# Patient Record
Sex: MALE | Race: WHITE | Employment: FULL TIME | URBAN - METROPOLITAN AREA
[De-identification: names, ages, dates, MRNs, and addresses within clinical notes are randomized per-mention and may not be internally consistent; named-entity substitution may affect disease eponyms.]

---

## 2017-04-27 RX ORDER — SIMVASTATIN 20 MG
TABLET ORAL
Qty: 90 TABLET | Refills: 0 | Status: SHIPPED | OUTPATIENT
Start: 2017-04-27 | End: 2017-08-18 | Stop reason: SDUPTHER

## 2017-08-18 ENCOUNTER — OFFICE VISIT (OUTPATIENT)
Dept: INTERNAL MEDICINE CLINIC | Age: 67
End: 2017-08-18

## 2017-08-18 ENCOUNTER — HOSPITAL ENCOUNTER (OUTPATIENT)
Dept: OTHER | Age: 67
Discharge: HOME | End: 2017-08-18
Attending: INTERNAL MEDICINE | Admitting: INTERNAL MEDICINE

## 2017-08-18 VITALS
WEIGHT: 258 LBS | DIASTOLIC BLOOD PRESSURE: 65 MMHG | HEIGHT: 73 IN | RESPIRATION RATE: 16 BRPM | BODY MASS INDEX: 34.19 KG/M2 | SYSTOLIC BLOOD PRESSURE: 133 MMHG | HEART RATE: 86 BPM

## 2017-08-18 DIAGNOSIS — E66.9 OBESITY (BMI 30-39.9): ICD-10-CM

## 2017-08-18 DIAGNOSIS — M54.41 ACUTE RIGHT-SIDED LOW BACK PAIN WITH RIGHT-SIDED SCIATICA: ICD-10-CM

## 2017-08-18 DIAGNOSIS — I10 BENIGN ESSENTIAL HTN: ICD-10-CM

## 2017-08-18 DIAGNOSIS — E78.5 HYPERLIPIDEMIA, UNSPECIFIED HYPERLIPIDEMIA TYPE: ICD-10-CM

## 2017-08-18 DIAGNOSIS — L82.1 SEBORRHEIC KERATOSES: ICD-10-CM

## 2017-08-18 DIAGNOSIS — Z12.11 SCREEN FOR COLON CANCER: ICD-10-CM

## 2017-08-18 DIAGNOSIS — E11.9 TYPE 2 DIABETES MELLITUS WITHOUT COMPLICATION, WITHOUT LONG-TERM CURRENT USE OF INSULIN (HCC): Primary | ICD-10-CM

## 2017-08-18 LAB
CREATININE URINE: 77.8 MG/DL (ref 39–259)
MICROALBUMIN UR-MCNC: <1.2 MG/DL
MICROALBUMIN/CREAT UR-RTO: NORMAL MG/G (ref 0–30)

## 2017-08-18 PROCEDURE — 99214 OFFICE O/P EST MOD 30 MIN: CPT | Performed by: INTERNAL MEDICINE

## 2017-08-18 RX ORDER — SIMVASTATIN 20 MG
TABLET ORAL
Qty: 90 TABLET | Refills: 3 | Status: SHIPPED | OUTPATIENT
Start: 2017-08-18 | End: 2018-11-17 | Stop reason: SDUPTHER

## 2017-08-25 ENCOUNTER — TELEPHONE (OUTPATIENT)
Dept: INTERNAL MEDICINE CLINIC | Age: 67
End: 2017-08-25

## 2017-08-25 DIAGNOSIS — R10.9 STOMACH PAIN: Primary | ICD-10-CM

## 2017-09-15 ENCOUNTER — HOSPITAL ENCOUNTER (OUTPATIENT)
Dept: ULTRASOUND IMAGING | Age: 67
Discharge: HOME | End: 2017-09-15
Attending: INTERNAL MEDICINE | Admitting: INTERNAL MEDICINE

## 2017-09-15 DIAGNOSIS — R10.9 STOMACH PAIN: ICD-10-CM

## 2017-09-15 DIAGNOSIS — R10.9 ABDOMINAL PAIN: ICD-10-CM

## 2017-09-19 DIAGNOSIS — K80.20 CALCULUS OF GALLBLADDER WITHOUT CHOLECYSTITIS WITHOUT OBSTRUCTION: Primary | ICD-10-CM

## 2017-10-02 ENCOUNTER — OFFICE VISIT (OUTPATIENT)
Dept: SURGERY | Age: 67
End: 2017-10-02

## 2017-10-02 VITALS
DIASTOLIC BLOOD PRESSURE: 78 MMHG | WEIGHT: 259 LBS | BODY MASS INDEX: 34.33 KG/M2 | TEMPERATURE: 97.9 F | SYSTOLIC BLOOD PRESSURE: 130 MMHG | HEIGHT: 73 IN

## 2017-10-02 DIAGNOSIS — K80.20 CALCULUS OF GALLBLADDER WITHOUT CHOLECYSTITIS WITHOUT OBSTRUCTION: Primary | ICD-10-CM

## 2017-10-02 PROCEDURE — 99214 OFFICE O/P EST MOD 30 MIN: CPT | Performed by: SURGERY

## 2017-10-16 ENCOUNTER — HOSPITAL ENCOUNTER (OUTPATIENT)
Dept: ENDOSCOPY | Age: 67
Discharge: HOME | End: 2017-10-16
Attending: INTERNAL MEDICINE | Admitting: INTERNAL MEDICINE

## 2017-10-16 VITALS
SYSTOLIC BLOOD PRESSURE: 145 MMHG | HEIGHT: 72 IN | DIASTOLIC BLOOD PRESSURE: 77 MMHG | HEART RATE: 76 BPM | BODY MASS INDEX: 35.21 KG/M2 | WEIGHT: 260 LBS | TEMPERATURE: 97.8 F | RESPIRATION RATE: 20 BRPM | OXYGEN SATURATION: 100 %

## 2017-10-16 LAB
GLUCOSE BLD-MCNC: 150 MG/DL (ref 70–99)
PERFORMED ON: ABNORMAL

## 2017-10-16 NOTE — PLAN OF CARE
ADMISSION PRE-PROCEDURE INTRA-PROCEDURE POST-PROCEDURE: RECOVERY/ DISCHARGE   ASSESSMENT &  EVALUATION CONSULTS [x] Verify patient identification, allergies, vital signs, NPO, IV, & SPO2  [x] Complete the ALMA SCORE  [x] Consent form to treat signed  [x] History and Physical [x] Reassess patient after pre- procedure medication given  [x] GI physician evaluates pt  [x] Verify patient's name, allergies [x] Continuous monitoring of vital  signs, SPO2, LOC  [x] Emotional status  [x] Patient comfort level [x] Total system admission assessment with appropriate intervention  [x] Pain evaluation and management  [x] Discharge criteria met  [x] Discharge assessment with appropriate intervention  [x] Compare with pre-procedure status  [x] Discharge by appropriate physician   DIAGNOSTIC / TESTS [x]  Lab work ordered  [x]  Obtain and attach lab work to patient's chart  [x]  Report abnormals and F/U with physician [x] Assure needed test results are present [x] Diagnostic testing as indicated  [x] Obtained specimens sent to lab [x] Diagnostic testing as indicated    [x] Assess patient's gag reflex    MEDICATIONS [x]  Conscious sedation medications  explained to patient  [x]  Start IV per physician's order  and/or protocol  [x]  Verify compliance of the colon prep  []  Pre-procedure med. as ordered  [x] Verify compliance of colon prep. [x] Re-check IV access [x] Assist with administration of IV conscious sedation medication  [x] Start O2  per  nasal cannula, if needed   [x] IV fluids as indicated/ordered  [x] Administration of medications as ordered  [x] Medications as prescribed  [x] D/C O2 therapy as ordered   PROCEDURE/TREATMENT [x] Specific order by GI physician  [x] Specific procedure as scheduled  [x]  Verify procedure as ordered [x] Time out/procedure verification checklist complete.  [x] EGD/Colonoscopy and related procedures  [x] Assist physician with the procedure [x] Treatment as indicated   NUTRITION / DIET [x] NPO

## 2017-10-24 ENCOUNTER — TELEPHONE (OUTPATIENT)
Dept: INTERNAL MEDICINE CLINIC | Age: 67
End: 2017-10-24

## 2018-03-02 ENCOUNTER — TELEPHONE (OUTPATIENT)
Dept: INTERNAL MEDICINE CLINIC | Age: 68
End: 2018-03-02

## 2018-03-02 DIAGNOSIS — E78.5 HYPERLIPIDEMIA, UNSPECIFIED HYPERLIPIDEMIA TYPE: ICD-10-CM

## 2018-03-02 DIAGNOSIS — E55.9 VITAMIN D DEFICIENCY: ICD-10-CM

## 2018-03-02 DIAGNOSIS — E11.9 TYPE 2 DIABETES MELLITUS WITHOUT COMPLICATION, WITHOUT LONG-TERM CURRENT USE OF INSULIN (HCC): Primary | ICD-10-CM

## 2018-03-02 DIAGNOSIS — I10 BENIGN ESSENTIAL HTN: ICD-10-CM

## 2018-03-27 ENCOUNTER — TELEPHONE (OUTPATIENT)
Dept: ORTHOPEDIC SURGERY | Age: 68
End: 2018-03-27

## 2018-03-27 LAB
ALBUMIN SERPL-MCNC: 4.4 G/DL
ALP BLD-CCNC: 64 U/L
ALT SERPL-CCNC: 29 U/L
ANION GAP SERPL CALCULATED.3IONS-SCNC: NORMAL MMOL/L
AST SERPL-CCNC: 20 U/L
AVERAGE GLUCOSE: NORMAL
BASOPHILS ABSOLUTE: NORMAL /ΜL
BASOPHILS ABSOLUTE: NORMAL /ΜL
BASOPHILS RELATIVE PERCENT: NORMAL %
BASOPHILS RELATIVE PERCENT: NORMAL %
BILIRUB SERPL-MCNC: 0.4 MG/DL (ref 0.1–1.4)
BUN BLDV-MCNC: 16 MG/DL
CALCIUM SERPL-MCNC: 9.4 MG/DL
CHLORIDE BLD-SCNC: 103 MMOL/L
CHOLESTEROL, TOTAL: 153 MG/DL
CHOLESTEROL/HDL RATIO: 2.7
CO2: 26 MMOL/L
CREAT SERPL-MCNC: 0.79 MG/DL
EOSINOPHILS ABSOLUTE: NORMAL /ΜL
EOSINOPHILS ABSOLUTE: NORMAL /ΜL
EOSINOPHILS RELATIVE PERCENT: NORMAL %
EOSINOPHILS RELATIVE PERCENT: NORMAL %
GFR CALCULATED: NORMAL
GLUCOSE BLD-MCNC: 139 MG/DL
HBA1C MFR BLD: 6.5 %
HCT VFR BLD CALC: 42.9 % (ref 41–53)
HCT VFR BLD CALC: NORMAL % (ref 41–53)
HDLC SERPL-MCNC: 56 MG/DL (ref 35–70)
HEMOGLOBIN: 14.9 G/DL (ref 13.5–17.5)
HEMOGLOBIN: NORMAL G/DL (ref 13.5–17.5)
LDL CHOLESTEROL CALCULATED: 78 MG/DL (ref 0–160)
LYMPHOCYTES ABSOLUTE: NORMAL /ΜL
LYMPHOCYTES ABSOLUTE: NORMAL /ΜL
LYMPHOCYTES RELATIVE PERCENT: NORMAL %
LYMPHOCYTES RELATIVE PERCENT: NORMAL %
MCH RBC QN AUTO: NORMAL PG
MCH RBC QN AUTO: NORMAL PG
MCHC RBC AUTO-ENTMCNC: NORMAL G/DL
MCHC RBC AUTO-ENTMCNC: NORMAL G/DL
MCV RBC AUTO: NORMAL FL
MCV RBC AUTO: NORMAL FL
MONOCYTES ABSOLUTE: NORMAL /ΜL
MONOCYTES ABSOLUTE: NORMAL /ΜL
MONOCYTES RELATIVE PERCENT: NORMAL %
MONOCYTES RELATIVE PERCENT: NORMAL %
NEUTROPHILS ABSOLUTE: NORMAL /ΜL
NEUTROPHILS ABSOLUTE: NORMAL /ΜL
NEUTROPHILS RELATIVE PERCENT: NORMAL %
NEUTROPHILS RELATIVE PERCENT: NORMAL %
PDW BLD-RTO: NORMAL %
PLATELET # BLD: NORMAL K/ΜL
PLATELET # BLD: NORMAL K/ΜL
PMV BLD AUTO: NORMAL FL
PMV BLD AUTO: NORMAL FL
POTASSIUM SERPL-SCNC: 4.7 MMOL/L
RBC # BLD: 4.8 10^6/ΜL
RBC # BLD: NORMAL 10^6/ΜL
SODIUM BLD-SCNC: 139 MMOL/L
TOTAL PROTEIN: 7.3
TRIGL SERPL-MCNC: 104 MG/DL
TSH SERPL DL<=0.05 MIU/L-ACNC: 1.81 UIU/ML
VITAMIN D 25-HYDROXY: 43
VITAMIN D2, 25 HYDROXY: NORMAL
VITAMIN D3,25 HYDROXY: NORMAL
VLDLC SERPL CALC-MCNC: NORMAL MG/DL
WBC # BLD: 7 10^3/ML
WBC # BLD: NORMAL 10^3/ML

## 2018-03-29 ENCOUNTER — OFFICE VISIT (OUTPATIENT)
Dept: INTERNAL MEDICINE CLINIC | Age: 68
End: 2018-03-29

## 2018-03-29 VITALS
SYSTOLIC BLOOD PRESSURE: 134 MMHG | HEIGHT: 73 IN | RESPIRATION RATE: 16 BRPM | BODY MASS INDEX: 34.72 KG/M2 | WEIGHT: 262 LBS | HEART RATE: 78 BPM | DIASTOLIC BLOOD PRESSURE: 74 MMHG

## 2018-03-29 DIAGNOSIS — E55.9 VITAMIN D DEFICIENCY: ICD-10-CM

## 2018-03-29 DIAGNOSIS — E11.9 TYPE 2 DIABETES MELLITUS WITHOUT COMPLICATION, WITHOUT LONG-TERM CURRENT USE OF INSULIN (HCC): Primary | ICD-10-CM

## 2018-03-29 DIAGNOSIS — R39.12 BENIGN PROSTATIC HYPERPLASIA WITH WEAK URINARY STREAM: ICD-10-CM

## 2018-03-29 DIAGNOSIS — Z12.5 SCREENING FOR PROSTATE CANCER: ICD-10-CM

## 2018-03-29 DIAGNOSIS — I10 BENIGN ESSENTIAL HTN: ICD-10-CM

## 2018-03-29 DIAGNOSIS — N40.1 BENIGN PROSTATIC HYPERPLASIA WITH WEAK URINARY STREAM: ICD-10-CM

## 2018-03-29 DIAGNOSIS — E78.5 HYPERLIPIDEMIA, UNSPECIFIED HYPERLIPIDEMIA TYPE: ICD-10-CM

## 2018-03-29 DIAGNOSIS — K80.20 CALCULUS OF GALLBLADDER WITHOUT CHOLECYSTITIS WITHOUT OBSTRUCTION: ICD-10-CM

## 2018-03-29 PROCEDURE — 99214 OFFICE O/P EST MOD 30 MIN: CPT | Performed by: INTERNAL MEDICINE

## 2018-03-29 RX ORDER — TAMSULOSIN HYDROCHLORIDE 0.4 MG/1
0.4 CAPSULE ORAL EVERY EVENING
Qty: 30 CAPSULE | Refills: 1 | Status: SHIPPED | OUTPATIENT
Start: 2018-03-29 | End: 2018-05-19 | Stop reason: SDUPTHER

## 2018-03-29 ASSESSMENT — PATIENT HEALTH QUESTIONNAIRE - PHQ9
2. FEELING DOWN, DEPRESSED OR HOPELESS: 0
SUM OF ALL RESPONSES TO PHQ9 QUESTIONS 1 & 2: 0
1. LITTLE INTEREST OR PLEASURE IN DOING THINGS: 0
SUM OF ALL RESPONSES TO PHQ QUESTIONS 1-9: 0

## 2018-03-29 NOTE — PROGRESS NOTES
(BMI 30-39. 9) 8/18/2017    Psoriasis     Type II or unspecified type diabetes mellitus without mention of complication, not stated as uncontrolled        Prior to Admission medications    Medication Sig Start Date End Date Taking? Authorizing Provider   sitaGLIPtan-metformin (JANUMET)  MG per tablet TAKE ONE-HALF TABLET BY MOUTH TWICE A DAY 3/27/18  Yes Navid Rodrigez, DO   lisinopril (PRINIVIL;ZESTRIL) 20 MG tablet TAKE ONE TABLET BY MOUTH DAILY 1/15/18  Yes Navid Rodrigez DO   simvastatin (ZOCOR) 20 MG tablet TAKE ONE TABLET BY MOUTH EVERY EVENING 8/18/17  Yes Navid Rodrigez,    Cholecalciferol (VITAMIN D3) 1000 UNITS TABS Take 1 tablet by mouth daily. 3/13/13  Yes Navid Rodrigez, DO   FREESTYLE LITE strip TEST TWICE daily 2/9/13  Yes Navid Rodrigez DO   aspirin EC 81 MG EC tablet Take 1 tablet by mouth daily. 12/6/11  Yes Amor Jenkins, DO            Review of Systems - As per HPI  :  Slow stream, nocturia x 1/night. GI:  Left sided chest discomfort relieved with flatus. + Occasional GERD. TUMS  CV:  No exertional Chest pain. OBJECTIVE:  /74   Pulse 78   Resp 16   Ht 6' 1\" (1.854 m)   Wt 262 lb (118.8 kg)   BMI 34.57 kg/m²    Wt Readings from Last 3 Encounters:   03/29/18 262 lb (118.8 kg)   10/16/17 260 lb (117.9 kg)   10/02/17 259 lb (117.5 kg)       GEN: NAD, A&O, Non-toxic, Obese  HEENT: NC/AT, CHAD, Oral cavity Clear,  Anicteric. TM's NL. NECK: Supple. No thyromegaly or nodules. No increased JVP. No soft tissue masses. Trachea midline. LYMPH: No C/SC nodes. CV:  Reg rhythm, rate normal, no ectopy. No murmurs. VASC: No carotid bruits, Pedal pulses symmetrical  PULM: CTA  GI:  Abd Non-tender. No masses. EXT: No edema  NEURO: No focal or lateralizing deficits. SKIN: No rash           ASSESSMENT/PLAN:    1.  Type 2 diabetes mellitus without complication, without long-term current use of insulin ([facility])  Condition remains under excellent control  Continue

## 2018-04-02 ENCOUNTER — OFFICE VISIT (OUTPATIENT)
Dept: INTERNAL MEDICINE CLINIC | Age: 68
End: 2018-04-02

## 2018-04-02 VITALS
BODY MASS INDEX: 34.83 KG/M2 | OXYGEN SATURATION: 98 % | WEIGHT: 264 LBS | DIASTOLIC BLOOD PRESSURE: 70 MMHG | SYSTOLIC BLOOD PRESSURE: 134 MMHG | HEART RATE: 100 BPM | TEMPERATURE: 98.3 F

## 2018-04-02 DIAGNOSIS — R35.0 URINARY FREQUENCY: ICD-10-CM

## 2018-04-02 DIAGNOSIS — R30.0 DYSURIA: Primary | ICD-10-CM

## 2018-04-02 PROCEDURE — 99213 OFFICE O/P EST LOW 20 MIN: CPT | Performed by: INTERNAL MEDICINE

## 2018-04-02 RX ORDER — CIPROFLOXACIN 500 MG/1
500 TABLET, FILM COATED ORAL 2 TIMES DAILY
Qty: 6 TABLET | Refills: 0 | Status: SHIPPED | OUTPATIENT
Start: 2018-04-02 | End: 2018-04-05

## 2018-04-04 LAB — URINE CULTURE, ROUTINE: NORMAL

## 2018-07-16 RX ORDER — LISINOPRIL 20 MG/1
TABLET ORAL
Qty: 90 TABLET | Refills: 0 | Status: SHIPPED | OUTPATIENT
Start: 2018-07-16 | End: 2018-10-06 | Stop reason: SDUPTHER

## 2018-10-11 LAB
ALBUMIN SERPL-MCNC: 4.5 G/DL
ALP BLD-CCNC: 60 U/L
ALT SERPL-CCNC: 1.8 U/L
ANION GAP SERPL CALCULATED.3IONS-SCNC: NORMAL MMOL/L
AST SERPL-CCNC: 55 U/L
AVERAGE GLUCOSE: NORMAL
BASOPHILS ABSOLUTE: 41 /ΜL
BASOPHILS RELATIVE PERCENT: 0.6 %
BILIRUB SERPL-MCNC: 0.7 MG/DL (ref 0.1–1.4)
BUN BLDV-MCNC: 13 MG/DL
CALCIUM SERPL-MCNC: 9.6 MG/DL
CHLORIDE BLD-SCNC: 104 MMOL/L
CHOLESTEROL, TOTAL: 163 MG/DL
CHOLESTEROL/HDL RATIO: 2.6
CO2: 28 MMOL/L
CREAT SERPL-MCNC: 0.8 MG/DL
EOSINOPHILS ABSOLUTE: 129 /ΜL
EOSINOPHILS RELATIVE PERCENT: 1.9 %
GFR CALCULATED: 93
GLUCOSE BLD-MCNC: 143 MG/DL
HBA1C MFR BLD: 6.9 %
HCT VFR BLD CALC: 42.4 % (ref 41–53)
HDLC SERPL-MCNC: 63 MG/DL (ref 35–70)
HEMOGLOBIN: 14.5 G/DL (ref 13.5–17.5)
LDL CHOLESTEROL CALCULATED: 77 MG/DL (ref 0–160)
LYMPHOCYTES ABSOLUTE: 1775 /ΜL
LYMPHOCYTES RELATIVE PERCENT: 26.1 %
MCH RBC QN AUTO: 31 PG
MCHC RBC AUTO-ENTMCNC: 34.2 G/DL
MCV RBC AUTO: 90.6 FL
MONOCYTES ABSOLUTE: 544 /ΜL
MONOCYTES RELATIVE PERCENT: 8 %
NEUTROPHILS ABSOLUTE: 4311 /ΜL
NEUTROPHILS RELATIVE PERCENT: 63.4 %
PLATELET # BLD: 204 K/ΜL
PMV BLD AUTO: 11 FL
POTASSIUM SERPL-SCNC: 4.3 MMOL/L
RBC # BLD: 4.68 10^6/ΜL
SODIUM BLD-SCNC: 140 MMOL/L
TOTAL PROTEIN: 7.4
TRIGL SERPL-MCNC: 132 MG/DL
VITAMIN D 25-HYDROXY: 37
VITAMIN D2, 25 HYDROXY: NORMAL
VITAMIN D3,25 HYDROXY: NORMAL
VLDLC SERPL CALC-MCNC: NORMAL MG/DL
WBC # BLD: 6.8 10^3/ML

## 2018-10-26 ENCOUNTER — OFFICE VISIT (OUTPATIENT)
Dept: INTERNAL MEDICINE CLINIC | Age: 68
End: 2018-10-26
Payer: COMMERCIAL

## 2018-10-26 VITALS
DIASTOLIC BLOOD PRESSURE: 60 MMHG | WEIGHT: 262 LBS | BODY MASS INDEX: 35.49 KG/M2 | HEIGHT: 72 IN | HEART RATE: 100 BPM | SYSTOLIC BLOOD PRESSURE: 120 MMHG | RESPIRATION RATE: 16 BRPM

## 2018-10-26 DIAGNOSIS — E78.5 HYPERLIPIDEMIA, UNSPECIFIED HYPERLIPIDEMIA TYPE: ICD-10-CM

## 2018-10-26 DIAGNOSIS — R06.83 SNORING: ICD-10-CM

## 2018-10-26 DIAGNOSIS — I10 BENIGN ESSENTIAL HTN: ICD-10-CM

## 2018-10-26 DIAGNOSIS — Z12.5 SCREENING FOR PROSTATE CANCER: ICD-10-CM

## 2018-10-26 DIAGNOSIS — Z23 NEED FOR PNEUMOCOCCAL VACCINATION: ICD-10-CM

## 2018-10-26 DIAGNOSIS — M54.16 LUMBAR RADICULOPATHY: ICD-10-CM

## 2018-10-26 DIAGNOSIS — E11.9 TYPE 2 DIABETES MELLITUS WITHOUT COMPLICATION, WITHOUT LONG-TERM CURRENT USE OF INSULIN (HCC): Primary | ICD-10-CM

## 2018-10-26 DIAGNOSIS — K80.20 CALCULUS OF GALLBLADDER WITHOUT CHOLECYSTITIS WITHOUT OBSTRUCTION: ICD-10-CM

## 2018-10-26 LAB
CREATININE URINE: 131.1 MG/DL (ref 39–259)
MICROALBUMIN UR-MCNC: 3.5 MG/DL
MICROALBUMIN/CREAT UR-RTO: 26.7 MG/G (ref 0–30)

## 2018-10-26 PROCEDURE — 90670 PCV13 VACCINE IM: CPT | Performed by: INTERNAL MEDICINE

## 2018-10-26 PROCEDURE — 90471 IMMUNIZATION ADMIN: CPT | Performed by: INTERNAL MEDICINE

## 2018-10-26 PROCEDURE — 99214 OFFICE O/P EST MOD 30 MIN: CPT | Performed by: INTERNAL MEDICINE

## 2018-10-26 RX ORDER — MELOXICAM 15 MG/1
15 TABLET ORAL DAILY
Qty: 30 TABLET | Refills: 3 | Status: SHIPPED | OUTPATIENT
Start: 2018-10-26 | End: 2019-05-24 | Stop reason: ALTCHOICE

## 2018-10-26 RX ORDER — AMOXICILLIN 250 MG
2 CAPSULE ORAL 2 TIMES DAILY
COMMUNITY

## 2018-10-26 NOTE — PATIENT INSTRUCTIONS
and adults 3to 59years of age with certain health conditions, and for all adults 72years of age and older. Your doctor can give you details. Some people should not get this vaccine  Anyone who has ever had a life-threatening allergic reaction to a dose of this vaccine, to an earlier pneumococcal vaccine called PCV7, or to any vaccine containing diphtheria toxoid (for example, DTaP), should not get PCV13. Anyone with a severe allergy to any component of PCV13 should not get the vaccine. Tell your doctor if the person being vaccinated has any severe allergies. If the person scheduled for vaccination is not feeling well, your healthcare provider might decide to reschedule the shot on another day. Risks of a vaccine reaction  With any medicine, including vaccines, there is a chance of reactions. These are usually mild and go away on their own, but serious reactions are also possible. Problems reported following PCV13 varied by age and dose in the series. The most common problems reported among children were:  · About half became drowsy after the shot, had a temporary loss of appetite, or had redness or tenderness where the shot was given. · About 1 out of 3 had swelling where the shot was given. · About 1 out of 3 had a mild fever, and about 1 in 20 had a fever over 102.2°F.  · Up to about 8 out of 10 became fussy or irritable. Adults have reported pain, redness, and swelling where the shot was given; also mild fever, fatigue, headache, chills, or muscle pain. Kandy Ferrari children who get PCV13 along with inactivated flu vaccine at the same time may be at increased risk for seizures caused by fever. Ask your doctor for more information. Problems that could happen after any vaccine:  · People sometimes faint after a medical procedure, including vaccination. Sitting or lying down for about 15 minutes can help prevent fainting and the injuries caused by a fall.  Tell your doctor if you feel dizzy or have vision changes or ringing in the ears. · Some older children and adults get severe pain in the shoulder and have difficulty moving the arm where a shot was given. This happens very rarely. · Any medication can cause a severe allergic reaction. Such reactions from a vaccine are very rare, estimated at about 1 in a million doses, and would happen within a few minutes to a few hours after the vaccination. As with any medicine, there is a very small chance of a vaccine causing a serious injury or death. The safety of vaccines is always being monitored. For more information, visit: www.cdc.gov/vaccinesafety. What if there is a serious reaction? What should I look for? · Look for anything that concerns you, such as signs of a severe allergic reaction, very high fever, or unusual behavior. Signs of a severe allergic reaction can include hives, swelling of the face and throat, difficulty breathing, a fast heartbeat, dizziness, and weakness, usually within a few minutes to a few hours after the vaccination. What should I do? · If you think it is a severe allergic reaction or other emergency that can't wait, call 911 or get the person to the nearest hospital. Otherwise, call your doctor. · Reactions should be reported to the Vaccine Adverse Event Reporting System (VAERS). Your doctor should file this report, or you can do it yourself through the VAERS website at www.vaers. hhs.gov, or by calling 1-836.246.5920. VAERS does not give medical advice. The National Vaccine Injury Compensation Program  The National Vaccine Injury Compensation Program (VICP) is a federal program that was created to compensate people who may have been injured by certain vaccines. Persons who believe they may have been injured by a vaccine can learn about the program and about filing a claim by calling 3-824.852.5128 or visiting the [employer] website at www.[facility].gov/vaccinecompensation. There is a time limit to file a claim for compensation.   How can

## 2018-11-19 RX ORDER — SIMVASTATIN 20 MG
TABLET ORAL
Qty: 90 TABLET | Refills: 2 | Status: SHIPPED | OUTPATIENT
Start: 2018-11-19 | End: 2019-10-11 | Stop reason: SDUPTHER

## 2018-12-11 ENCOUNTER — TELEPHONE (OUTPATIENT)
Dept: INTERNAL MEDICINE CLINIC | Age: 68
End: 2018-12-11

## 2018-12-11 NOTE — TELEPHONE ENCOUNTER
Patient requesting an appointment on Thursday or Friday to address leg pain. Patient states the medication he was prescribed did not help. Patient declined follow up appointment tomorrow. He is requesting a call back from [place] at phone number provided.

## 2019-01-21 RX ORDER — LISINOPRIL 20 MG/1
TABLET ORAL
Qty: 90 TABLET | Refills: 0 | Status: SHIPPED | OUTPATIENT
Start: 2019-01-21 | End: 2019-04-11 | Stop reason: SDUPTHER

## 2019-04-12 RX ORDER — LISINOPRIL 20 MG/1
TABLET ORAL
Qty: 90 TABLET | Refills: 0 | Status: SHIPPED | OUTPATIENT
Start: 2019-04-12 | End: 2019-07-18 | Stop reason: SDUPTHER

## 2019-05-06 LAB
ALBUMIN SERPL-MCNC: 4.2 G/DL
ALP BLD-CCNC: 61 U/L
ALT SERPL-CCNC: 37 U/L
ANION GAP SERPL CALCULATED.3IONS-SCNC: NORMAL MMOL/L
AST SERPL-CCNC: 20 U/L
AVERAGE GLUCOSE: NORMAL
BILIRUB SERPL-MCNC: 0.5 MG/DL (ref 0.1–1.4)
BUN BLDV-MCNC: 13 MG/DL
CALCIUM SERPL-MCNC: 9.2 MG/DL
CHLORIDE BLD-SCNC: 103 MMOL/L
CHOLESTEROL, TOTAL: 150 MG/DL
CHOLESTEROL/HDL RATIO: 2.8
CO2: 25 MMOL/L
CREAT SERPL-MCNC: 0.82 MG/DL
GFR CALCULATED: 92
GLUCOSE BLD-MCNC: 151 MG/DL
HBA1C MFR BLD: 7 %
HDLC SERPL-MCNC: 54 MG/DL (ref 35–70)
LDL CHOLESTEROL CALCULATED: 72 MG/DL (ref 0–160)
POTASSIUM SERPL-SCNC: 4.5 MMOL/L
SODIUM BLD-SCNC: 139 MMOL/L
TOTAL PROTEIN: 7
TRIGL SERPL-MCNC: 162 MG/DL
VLDLC SERPL CALC-MCNC: NORMAL MG/DL

## 2019-05-24 ENCOUNTER — HOSPITAL ENCOUNTER (OUTPATIENT)
Dept: GENERAL RADIOLOGY | Age: 69
Discharge: HOME | End: 2019-05-24
Payer: COMMERCIAL

## 2019-05-24 ENCOUNTER — OFFICE VISIT (OUTPATIENT)
Dept: INTERNAL MEDICINE CLINIC | Age: 69
End: 2019-05-24
Payer: COMMERCIAL

## 2019-05-24 ENCOUNTER — HOSPITAL ENCOUNTER (OUTPATIENT)
Age: 69
Discharge: HOME | End: 2019-05-24
Payer: COMMERCIAL

## 2019-05-24 VITALS
HEART RATE: 115 BPM | BODY MASS INDEX: 35.12 KG/M2 | SYSTOLIC BLOOD PRESSURE: 126 MMHG | HEIGHT: 73 IN | WEIGHT: 265 LBS | RESPIRATION RATE: 16 BRPM | DIASTOLIC BLOOD PRESSURE: 70 MMHG

## 2019-05-24 DIAGNOSIS — E11.9 TYPE 2 DIABETES MELLITUS WITHOUT COMPLICATION, WITHOUT LONG-TERM CURRENT USE OF INSULIN (HCC): Primary | ICD-10-CM

## 2019-05-24 DIAGNOSIS — R39.12 BENIGN PROSTATIC HYPERPLASIA WITH WEAK URINARY STREAM: ICD-10-CM

## 2019-05-24 DIAGNOSIS — E78.5 HYPERLIPIDEMIA, UNSPECIFIED HYPERLIPIDEMIA TYPE: ICD-10-CM

## 2019-05-24 DIAGNOSIS — M25.551 RIGHT HIP PAIN: ICD-10-CM

## 2019-05-24 DIAGNOSIS — L40.9 PSORIASIS: ICD-10-CM

## 2019-05-24 DIAGNOSIS — Z12.5 SCREENING FOR PROSTATE CANCER: ICD-10-CM

## 2019-05-24 DIAGNOSIS — N40.1 BENIGN PROSTATIC HYPERPLASIA WITH WEAK URINARY STREAM: ICD-10-CM

## 2019-05-24 DIAGNOSIS — I10 BENIGN ESSENTIAL HTN: ICD-10-CM

## 2019-05-24 DIAGNOSIS — K80.20 CALCULUS OF GALLBLADDER WITHOUT CHOLECYSTITIS WITHOUT OBSTRUCTION: ICD-10-CM

## 2019-05-24 PROCEDURE — 73502 X-RAY EXAM HIP UNI 2-3 VIEWS: CPT

## 2019-05-24 PROCEDURE — 99214 OFFICE O/P EST MOD 30 MIN: CPT | Performed by: INTERNAL MEDICINE

## 2019-05-24 RX ORDER — TAMSULOSIN HYDROCHLORIDE 0.4 MG/1
CAPSULE ORAL
Qty: 90 CAPSULE | Refills: 3 | Status: SHIPPED | OUTPATIENT
Start: 2019-05-24 | End: 2020-05-26

## 2019-05-24 ASSESSMENT — PATIENT HEALTH QUESTIONNAIRE - PHQ9
SUM OF ALL RESPONSES TO PHQ9 QUESTIONS 1 & 2: 0
SUM OF ALL RESPONSES TO PHQ QUESTIONS 1-9: 0
SUM OF ALL RESPONSES TO PHQ QUESTIONS 1-9: 0
1. LITTLE INTEREST OR PLEASURE IN DOING THINGS: 0
2. FEELING DOWN, DEPRESSED OR HOPELESS: 0

## 2019-05-24 NOTE — PATIENT INSTRUCTIONS
Patient Education        The Hip Joint: Anatomy Sketch    Current as of: September 20, 2018  Content Version: 12.0  © 2006-2019 [employer]. Care instructions adapted under license by [facility] ([facility]). If you have questions about a medical condition or this instruction, always ask your healthcare professional. [address] any warranty or liability for your use of this information. Patient Education        Hip Pain: Care Instructions  Your Care Instructions    Hip pain may be caused by many things, including overuse, a fall, or a twisting movement. Another cause of hip pain is arthritis. Your pain may increase when you stand up, walk, or squat. The pain may come and go or may be constant. Home treatment can help relieve hip pain, swelling, and stiffness. If your pain is ongoing, you may need more tests and treatment. Follow-up care is a key part of your treatment and safety. Be sure to make and go to all appointments, and call your doctor if you are having problems. It's also a good idea to know your test results and keep a list of the medicines you take. How can you care for yourself at home? · Take pain medicines exactly as directed. ? If the doctor gave you a prescription medicine for pain, take it as prescribed. ? If you are not taking a prescription pain medicine, ask your doctor if you can take an over-the-counter medicine. · Rest and protect your hip. Take a break from any activity, including standing or walking, that may cause pain. · Put ice or a cold pack against your hip for 10 to 20 minutes at a time. Try to do this every 1 to 2 hours for the next 3 days (when you are awake) or until the swelling goes down. Put a thin cloth between the ice and your skin. · Sleep on your healthy side with a pillow between your knees, or sleep on your back with pillows under your knees.   · If there is no swelling, you can put moist heat, a heating pad, or a warm cloth on your hip. Do gentle stretching exercises to help keep your hip flexible. · Learn how to prevent falls. Have your vision and hearing checked regularly. Wear slippers or shoes with a nonskid sole. · Stay at a healthy weight. · Wear comfortable shoes. When should you call for help? Call 911 anytime you think you may need emergency care. For example, call if:    · You have sudden chest pain and shortness of breath, or you cough up blood.     · You are not able to stand or walk or bear weight.     · Your buttocks, legs, or feet feel numb or tingly.     · Your leg or foot is cool or pale or changes color.     · You have severe pain.    Call your doctor now or seek immediate medical care if:    · You have signs of infection, such as:  ? Increased pain, swelling, warmth, or redness in the hip area. ? Red streaks leading from the hip area. ? Pus draining from the hip area. ? A fever.     · You have signs of a blood clot, such as:  ? Pain in your calf, back of the knee, thigh, or groin. ? Redness and swelling in your leg or groin.     · You are not able to bend, straighten, or move your leg normally.     · You have trouble urinating or having bowel movements.    Watch closely for changes in your health, and be sure to contact your doctor if:    · You do not get better as expected. Where can you learn more? Go to [email or web address] and sign in to your [employer] account. Enter R838 in the [facility] box to learn more about \"Hip Pain: Care Instructions. \"     If you do not have an account, please click on the \"Sign Up Now\" link. Current as of: September 23, 2018  Content Version: 12.0  © 7952-6479 Healthwise[employer]. Care instructions adapted under license by [facility] ([facility]). If you have questions about a medical condition or this instruction, always ask your healthcare professional. [address] any warranty or liability for your use of this information. Patient Education        Psoriasis: Care Instructions  Your Care Instructions  Psoriasis (say \"mlp-IW-dp-maribel\") is a long-term skin problem that causes thick, white, silvery, or red patches on the skin. The patches may be small or large, and they occur most often on the knees, elbows, scalp, hands, feet, or lower back. The skin may be scaly. If the condition is severe, your skin can become itchy and tender. Psoriasis also can be embarrassing if the patches are on visible areas. You can treat psoriasis with good care at home and with medicine from your doctor. You may put medicine on your skin and take pills or have shots to stop the redness and swelling. Your doctor also may suggest ultraviolet light treatments. Follow-up care is a key part of your treatment and safety. Be sure to make and go to all appointments, and call your doctor if you are having problems. It's also a good idea to know your test results and keep a list of the medicines you take. How can you care for yourself at home? · If your doctor prescribes medicine, use it exactly as prescribed. Follow your doctor's advice for sunlight or ultraviolet light treatment. Call your doctor if you think you are having a problem with your medicine. · Protect your skin:  ? Keep your skin moist. After bathing, put an ointment, cream, or lotion on your skin while it is still damp. This seals in moisture. Use over-the-counter products that your doctor suggests. These may include Cetaphil, Lubriderm, or Eucerin. Petroleum jelly (such as Vaseline) and vegetable shortening (such as Crisco) also work. ? If you have psoriasis on your scalp, use a shampoo with salicylic acid, such as Neutrogena T/Sal.  ? Avoid harsh skin products, such as those that contain alcohol. ? Cover your skin in cold weather. ? Try to prevent sunburn. Although short periods of sun exposure reduce psoriasis in most people, too much sun can damage the skin and cause skin cancer.  In

## 2019-05-24 NOTE — PROGRESS NOTES
[facility]) Physicians  Internal Medicine  Patient Encounter  Danna Huizar D.O., [facility]         Chief Complaint   Patient presents with    Medication Check    Check-Up       HPI: 79 y.o. male seen today for follow up regarding the status of his current chronic medical problems listed below and med review. Diabetes Mellitus Type II, Follow-up--   Lab Results   Component Value Date    LABA1C 7.0 (H) 05/04/2019     He is on Janumet 50/1000 1/2 BID. He states he started to try making changes again in the diet, but not much. He does not exercise. Sugars-- 120's-140's. Last Eye Exam-- 1/19/2018. Patient reports he did have his diabetic eye exam.  No report has been received. U.Microalbumin/Cr--8/18/2018  Pt is on ACEI-- Lisinopril  Complications-- None  Insulin Treated? No.    + ASA 81 mg daily  + Statin  Last LDL-- 72    HTN-- he denies problems suggestive of accelerated BP. Home readings-- 130'a/70's-80. He denies HA's, dizziness, lightheadedness, syncope. BPH-- Patient remains on Flomax. He is requesting a refill. He denies symptoms of nocturia. Stream is slower, but better on the AM.      Hyperlipidemia:    Lab Results   Component Value Date    LDLCALC 72 05/04/2019     Patient remains on simvastatin 20 mg daily. He is tolerating the medication for many years. Unfortunately he does not follow a strict low-fat diet nor does he exercise. Gallstones-- Previous ultrasound in September 2017 showed cholelithiasis. He was symptomatic at that time but no longer has any symptoms of abdominal pain or nausea after eating. Also, he continues to have right thigh pain. He is using TENS at home. He denies right low back pain. He has pain when flexing the right hip. He has pain when crossing leg to put on sock on the right. Pain increases with walking. Mobic did no help. Symptoms x 7-8 months.          Past Medical History:   Diagnosis Date    BPH     Cholelithiasis     H. pylori infection 10/2017    Dr. Asia Levy Herpes     Hyperlipidemia     Hypertension     Obesity (BMI 30-39. 9) 8/18/2017    Psoriasis     Type II or unspecified type diabetes mellitus without mention of complication, not stated as uncontrolled      Prior to Visit Medications    Medication Sig Taking? Authorizing Provider   tamsulosin (FLOMAX) 0.4 MG capsule TAKE ONE CAPSULE BY MOUTH EVERY EVENING Yes Navid Rodrigez DO   sitaGLIPtan-metformin (JANUMET)  MG per tablet TAKE ONE-HALF TABLET BY MOUTH TWICE A DAY Yes Navid Rodrigez DO   lisinopril (PRINIVIL;ZESTRIL) 20 MG tablet TAKE ONE TABLET BY MOUTH DAILY Yes Navid Rodrigez DO   simvastatin (ZOCOR) 20 MG tablet TAKE ONE TABLET BY MOUTH EVERY EVENING Yes Navid Rodrigez DO   Cholecalciferol (VITAMIN D3) 1000 UNITS TABS Take 1 tablet by mouth daily. Yes Navid Rodrigez DO   aspirin EC 81 MG EC tablet Take 1 tablet by mouth daily. Yes Navid Rodrigez DO   senna-docusate (PERICOLACE) 8.6-50 MG per tablet Take 2 tablets by mouth 2 times daily  Historical Provider, MD   FREESTYLE LITE strip TEST TWICE daily  [address] [place] [address], DO           Review of Systems - As per HPI  :  Slow stream, nocturia x 1/night. GI:  Left sided chest discomfort relieved with flatus. + Occasional GERD. TUMS  CV:  No exertional Chest pain. OBJECTIVE:  /70   Pulse 115   Resp 16   Ht 6' 1\" (1.854 m)   Wt 265 lb (120.2 kg)   BMI 34.96 kg/m²    Wt Readings from Last 3 Encounters:   05/24/19 265 lb (120.2 kg)   10/26/18 262 lb (118.8 kg)   04/02/18 264 lb (119.7 kg)       GEN: NAD, A&O, Non-toxic, Obese  HEENT: NC/AT, CHAD, Oral cavity Clear,  Anicteric. TM's NL. Throat is NL  NECK: Supple. No thyromegaly or nodules. No increased JVP. Trachea midline. LYMPH: No C/SC nodes. CV:  Reg rhythm, rate normal, No murmur, no ectopy. VASC: No carotid bruits, Pedal pulses symmetrical  PULM: CTA  GI:  Abd Non-tender. No masses.     EXT: No edema  NEURO: No focal or lateralizing deficits. MS:  Pain with right hip flexion and IR. Restricted right hip external rotation. SKIN: Stents or surface of the right elbow, left knee, low back reveals scaly, silvery plaques consistent with psoriasis. ASSESSMENT/PLAN:    1. Type 2 diabetes mellitus without complication, without long-term current use of insulin ([facility])  Condition is fairly well controlled with an A1c of 7%. Counseled patient on the need for much more aggressive lifestyle modification including a carbohydrate restriction diet and portion control  She also counseled on the need for more physical activity/regular exercise  Obtain diabetic retinal eye exam report    2. Benign prostatic hyperplasia with weak urinary stream  Condition is fairly well-controlled  Continue Flomax  PSA in the fall  - tamsulosin (FLOMAX) 0.4 MG capsule; TAKE ONE CAPSULE BY MOUTH EVERY EVENING  Dispense: 90 capsule; Refill: 3    3. Benign essential HTN  Blood pressure is well controlled  Continue lisinopril  Continue no added salt diet  Continue portion control/calorie restriction, exercise and ongoing efforts with weight loss    4. Hyperlipidemia, unspecified hyperlipidemia type  Condition is well controlled  Continue simvastatin    5. Psoriasis  Condition is uncontrolled. Recommend he follow-up with his dermatologist, Dr. Ava Fernando  - External Referral To Dermatology    6. Right hip pain  This is a new problem. When his back was addressed in 2017 it was thought he was having some radicular pain. Now he locates the pain more in the right groin. X-ray right hip  - XR HIP RIGHT (2-3 VIEWS); Future    7.  Calculus of gallbladder without cholecystitis without obstruction  asymptomatic        Future lab orders provided

## 2019-05-30 ENCOUNTER — TELEPHONE (OUTPATIENT)
Dept: INTERNAL MEDICINE CLINIC | Age: 69
End: 2019-05-30

## 2019-05-30 DIAGNOSIS — M16.11 PRIMARY OSTEOARTHRITIS OF RIGHT HIP: Primary | ICD-10-CM

## 2019-05-30 NOTE — TELEPHONE ENCOUNTER
Patient wants to know the who the orthodontist is that Dr. Chi Rodriguez has referred him to and the phone number. Patient can be reached at the number provided to advise.

## 2019-06-21 ENCOUNTER — TELEPHONE (OUTPATIENT)
Dept: INTERNAL MEDICINE CLINIC | Age: 69
End: 2019-06-21

## 2019-06-21 NOTE — TELEPHONE ENCOUNTER
Patient requesting a call back from [facility]. Patient refused to provide and information regarding the reason for his call, even after being asked several times. Patient can be reached at phone number provided.

## 2019-06-24 ENCOUNTER — TELEPHONE (OUTPATIENT)
Dept: INTERNAL MEDICINE CLINIC | Age: 69
End: 2019-06-24

## 2019-06-24 NOTE — TELEPHONE ENCOUNTER
Patient's wife is requesting a copy of patient's recent Hip Xray so that he can take to Ortho appointment. She will pick this up when it is ready.      Also requesting an RX for a free style blood glucose monitor system to be sent to [address], [address] [phone]

## 2019-07-17 ENCOUNTER — OFFICE VISIT (OUTPATIENT)
Dept: INTERNAL MEDICINE CLINIC | Age: 69
End: 2019-07-17
Payer: COMMERCIAL

## 2019-07-17 VITALS
OXYGEN SATURATION: 97 % | BODY MASS INDEX: 34.85 KG/M2 | HEART RATE: 90 BPM | WEIGHT: 263 LBS | SYSTOLIC BLOOD PRESSURE: 120 MMHG | HEIGHT: 73 IN | RESPIRATION RATE: 14 BRPM | DIASTOLIC BLOOD PRESSURE: 76 MMHG

## 2019-07-17 DIAGNOSIS — K80.20 CALCULUS OF GALLBLADDER WITHOUT CHOLECYSTITIS WITHOUT OBSTRUCTION: ICD-10-CM

## 2019-07-17 DIAGNOSIS — M16.11 PRIMARY OSTEOARTHRITIS OF RIGHT HIP: ICD-10-CM

## 2019-07-17 DIAGNOSIS — E11.9 TYPE 2 DIABETES MELLITUS WITHOUT COMPLICATION, WITHOUT LONG-TERM CURRENT USE OF INSULIN (HCC): ICD-10-CM

## 2019-07-17 DIAGNOSIS — E78.5 HYPERLIPIDEMIA, UNSPECIFIED HYPERLIPIDEMIA TYPE: ICD-10-CM

## 2019-07-17 DIAGNOSIS — E66.9 OBESITY (BMI 30-39.9): ICD-10-CM

## 2019-07-17 DIAGNOSIS — I10 BENIGN ESSENTIAL HTN: ICD-10-CM

## 2019-07-17 DIAGNOSIS — Z01.818 PREOP EXAM FOR INTERNAL MEDICINE: Primary | ICD-10-CM

## 2019-07-17 PROCEDURE — 93000 ELECTROCARDIOGRAM COMPLETE: CPT | Performed by: INTERNAL MEDICINE

## 2019-07-17 PROCEDURE — 99243 OFF/OP CNSLTJ NEW/EST LOW 30: CPT | Performed by: INTERNAL MEDICINE

## 2019-07-17 NOTE — PROGRESS NOTES
PULM:  Lungs are CTA. Symmetric breath sounds noted. AP Diameter NL. GI:  Abdomen is soft and nontender. No distension. No organomegaly. No masses. No pulsatile masses. Large abdomen. EXT:  No Cyanosis or clubbing.  + Trace bilateral lower extremity edema. SKIN: Warm and dry, normal turgor, no rash or lesions of concern. NEURO:  Cranial nerves 2-12 are NL. Speech fluent and coherent. Strength is 5/5 in all muscle groups. No sensory deficits. No focal or lateralizing deficits. Reflexes 2/4 and symmetric. Gait is normal.  MS: Right hip with increased pain with range of motion. PSYCH:  Mood and affect NL. Judgement and insight NL. Encounter Diagnoses   Name Primary?  Preop exam for internal medicine Yes    Primary osteoarthritis of right hip     Type 2 diabetes mellitus without complication, without long-term current use of insulin (HCC)     Benign essential HTN     Hyperlipidemia, unspecified hyperlipidemia type     Calculus of gallbladder without cholecystitis without obstruction     Obesity (BMI 30-39. 9)        Plan:  Acceptable risk for the planned procedure.   No contraindications at this time    EKG-- See report  Lab drawn  Nasal MRSA screen obtained  UA obtained    Pt will stop ASA 1 week prior to surgery  Pt will avoid NSAID's, OTC vitamin supplements and fish oil 1 week prior to procedure              Electronically Signed:  Bibi Yao D.O      Copy of H&P given to pt and sent to Sx

## 2019-07-18 LAB
BILIRUBIN URINE: NEGATIVE
BLOOD, URINE: NEGATIVE
CLARITY: CLEAR
COLOR: YELLOW
EPITHELIAL CELLS, UA: 0 /HPF (ref 0–5)
GLUCOSE URINE: NEGATIVE MG/DL
HYALINE CASTS: 0 /LPF (ref 0–8)
KETONES, URINE: NEGATIVE MG/DL
LEUKOCYTE ESTERASE, URINE: NEGATIVE
MICROSCOPIC EXAMINATION: NORMAL
NITRITE, URINE: NEGATIVE
PH UA: 6 (ref 5–8)
PROTEIN UA: NEGATIVE MG/DL
RBC UA: 1 /HPF (ref 0–4)
SPECIFIC GRAVITY UA: 1.02 (ref 1–1.03)
UROBILINOGEN, URINE: 0.2 E.U./DL
WBC UA: 0 /HPF (ref 0–5)

## 2019-07-19 LAB — MRSA CULTURE ONLY: NORMAL

## 2019-07-19 RX ORDER — LISINOPRIL 20 MG/1
TABLET ORAL
Qty: 90 TABLET | Refills: 0 | Status: SHIPPED | OUTPATIENT
Start: 2019-07-19 | End: 2019-10-11 | Stop reason: SDUPTHER

## 2019-07-26 ENCOUNTER — TELEPHONE (OUTPATIENT)
Dept: INTERNAL MEDICINE CLINIC | Age: 69
End: 2019-07-26

## 2019-07-26 NOTE — TELEPHONE ENCOUNTER
Marino Joseph with [facility] is requesting most recent lab results to be sent to Fax: [phone]    Please contact Marino Joseph at [phone] with questions/concerns.

## 2019-10-12 RX ORDER — LISINOPRIL 20 MG/1
TABLET ORAL
Qty: 90 TABLET | Refills: 0 | Status: SHIPPED | OUTPATIENT
Start: 2019-10-12 | End: 2020-01-13 | Stop reason: SDUPTHER

## 2019-10-12 RX ORDER — SIMVASTATIN 20 MG
TABLET ORAL
Qty: 90 TABLET | Refills: 1 | Status: SHIPPED | OUTPATIENT
Start: 2019-10-12 | End: 2020-04-27

## 2019-11-22 LAB
ALBUMIN SERPL-MCNC: 4.5 G/DL
ALP BLD-CCNC: NORMAL U/L
ALT SERPL-CCNC: NORMAL U/L
ANION GAP SERPL CALCULATED.3IONS-SCNC: NORMAL MMOL/L
AST SERPL-CCNC: NORMAL U/L
BILIRUB SERPL-MCNC: NORMAL MG/DL
BUN BLDV-MCNC: 15 MG/DL
CALCIUM SERPL-MCNC: 9.7 MG/DL
CHLORIDE BLD-SCNC: 102 MMOL/L
CHOLESTEROL, TOTAL: 163 MG/DL
CHOLESTEROL/HDL RATIO: 2.5
CO2: 28 MMOL/L
CREAT SERPL-MCNC: 0.89 MG/DL
GFR CALCULATED: NORMAL
GLUCOSE BLD-MCNC: 128 MG/DL
HDLC SERPL-MCNC: 65 MG/DL (ref 35–70)
LDL CHOLESTEROL CALCULATED: 74 MG/DL (ref 0–160)
POTASSIUM SERPL-SCNC: 4.3 MMOL/L
SODIUM BLD-SCNC: 139 MMOL/L
TOTAL PROTEIN: 7.4
TRIGL SERPL-MCNC: 163 MG/DL
VLDLC SERPL CALC-MCNC: NORMAL MG/DL

## 2019-12-06 ENCOUNTER — OFFICE VISIT (OUTPATIENT)
Dept: INTERNAL MEDICINE CLINIC | Age: 69
End: 2019-12-06
Payer: COMMERCIAL

## 2019-12-06 VITALS
RESPIRATION RATE: 16 BRPM | DIASTOLIC BLOOD PRESSURE: 74 MMHG | SYSTOLIC BLOOD PRESSURE: 130 MMHG | HEIGHT: 73 IN | HEART RATE: 91 BPM | OXYGEN SATURATION: 99 % | BODY MASS INDEX: 35.78 KG/M2 | WEIGHT: 270 LBS

## 2019-12-06 DIAGNOSIS — L40.9 PSORIASIS: ICD-10-CM

## 2019-12-06 DIAGNOSIS — M67.442 DIGITAL MUCOUS CYST OF FINGER OF LEFT HAND: ICD-10-CM

## 2019-12-06 DIAGNOSIS — K80.20 CALCULUS OF GALLBLADDER WITHOUT CHOLECYSTITIS WITHOUT OBSTRUCTION: ICD-10-CM

## 2019-12-06 DIAGNOSIS — E11.40 CONTROLLED TYPE 2 DIABETES WITH NEUROPATHY (HCC): Primary | ICD-10-CM

## 2019-12-06 DIAGNOSIS — I10 BENIGN ESSENTIAL HTN: ICD-10-CM

## 2019-12-06 DIAGNOSIS — E78.5 HYPERLIPIDEMIA, UNSPECIFIED HYPERLIPIDEMIA TYPE: ICD-10-CM

## 2019-12-06 PROCEDURE — 99214 OFFICE O/P EST MOD 30 MIN: CPT | Performed by: INTERNAL MEDICINE

## 2019-12-07 LAB
CREATININE URINE: 116.5 MG/DL (ref 39–259)
MICROALBUMIN UR-MCNC: 2.8 MG/DL
MICROALBUMIN/CREAT UR-RTO: 24 MG/G (ref 0–30)

## 2020-01-13 ENCOUNTER — TELEPHONE (OUTPATIENT)
Dept: INTERNAL MEDICINE CLINIC | Age: 70
End: 2020-01-13

## 2020-01-13 RX ORDER — LISINOPRIL 20 MG/1
TABLET ORAL
Qty: 90 TABLET | Refills: 3 | Status: SHIPPED | OUTPATIENT
Start: 2020-01-13 | End: 2020-05-28 | Stop reason: DRUGHIGH

## 2020-04-01 ENCOUNTER — TELEPHONE (OUTPATIENT)
Dept: INTERNAL MEDICINE CLINIC | Age: 70
End: 2020-04-01

## 2020-04-01 NOTE — TELEPHONE ENCOUNTER
Pt states his job title is . He states his company has abided by all rules and there is social distancing. Pt works M-F. He is in fear of the virus and is uncomfortable with leaving his house to go to work.

## 2020-04-27 RX ORDER — SIMVASTATIN 20 MG
TABLET ORAL
Qty: 90 TABLET | Refills: 3 | Status: SHIPPED | OUTPATIENT
Start: 2020-04-27 | End: 2021-04-23

## 2020-05-26 RX ORDER — TAMSULOSIN HYDROCHLORIDE 0.4 MG/1
CAPSULE ORAL
Qty: 90 CAPSULE | Refills: 2 | Status: SHIPPED | OUTPATIENT
Start: 2020-05-26 | End: 2021-03-11

## 2020-05-28 ENCOUNTER — VIRTUAL VISIT (OUTPATIENT)
Dept: INTERNAL MEDICINE CLINIC | Age: 70
End: 2020-05-28
Payer: COMMERCIAL

## 2020-05-28 VITALS
SYSTOLIC BLOOD PRESSURE: 141 MMHG | TEMPERATURE: 97.1 F | BODY MASS INDEX: 34.3 KG/M2 | WEIGHT: 260 LBS | DIASTOLIC BLOOD PRESSURE: 68 MMHG | HEART RATE: 75 BPM

## 2020-05-28 PROCEDURE — 99214 OFFICE O/P EST MOD 30 MIN: CPT | Performed by: INTERNAL MEDICINE

## 2020-05-28 RX ORDER — LISINOPRIL 20 MG/1
TABLET ORAL
Qty: 90 TABLET | Refills: 3 | Status: SHIPPED
Start: 2020-05-28 | End: 2020-06-29 | Stop reason: DRUGHIGH

## 2020-05-28 ASSESSMENT — ENCOUNTER SYMPTOMS
DIARRHEA: 0
VOMITING: 0
SHORTNESS OF BREATH: 0
SORE THROAT: 0
TROUBLE SWALLOWING: 0
COUGH: 0
NAUSEA: 0
ABDOMINAL PAIN: 0

## 2020-05-28 NOTE — PROGRESS NOTES
2020    [facility]) Physicians  Internal Medicine  Patient Encounter  Cody Mercado D.O., [address] -- Audio/Visual (During  public health emergency)      I discussed at this telephone/video visit is a nontraditional type of visit that we are conducting in lieu of an office visit to minimize patient risk during the coronavirus pandemic. I discussed with the patient that I would not be able to perform a full physical examination, but that in most other respects the visit would be beneficial to his/her continued medical care. He/She again gave verbal consent for us to conduct this type of visit. Chief Complaint   Patient presents with   Caroline Mind    Medication Check         HPI:    Keiry Smith (:  1950) has requested an audio/video evaluation for the following concern(s): Check up, med check    81 y.o. male being evaluated via virtual video visit due to the coronavirus pandemic emergency and public health crisis and inability to see the patient face-to-face in the office. Patient has been evaluated regarding the status of current chronic medical problems listed below along with medication review and reconciliation. He is C/O ringing in his left ear \"whistling. \"  Right is less. He denies hearing loss. No ear pain. He denies drainage. Pt requesting Handicap parking due to right hip pain. He had a hip replacement. The pain starts the more he walks. Diabetes Mellitus Type II, Follow-up--   Lab Results   Component Value Date    LABA1C 6.9 (H) 2020     No results found for: EAG   He states he has been taking variable doses of the Janumet. Sometimes he will take an extra 1/2 of the tab (Metformin 750). Sugars--Evening sugars-- 158-168 2 hours after eating. AM-- 150-158  Last Eye Exam-- 2018, OVERDUE. U.Microalbumin/Cr--2019  Pt is on ACEI-- Lisinopril  Complications-- None  Insulin Treated?  No.    + ASA 81 mg daily  + Statin  Last LDL--73     HTN-- Pt states he is taking his Lisinopril 20 mg daily. He denies cough. He denies HA's, dizziness. He denies syncope. Home BP's 130's.       BPH--He states he is voiding well. He states he is voiding well.       Hyperlipidemia:    Lab Results   Component Value Date    LDLCALC 73 05/20/2020    Pt states he is on Zocor. He has been on this for several years without adverse effects. He denies myalgias.       Gallstones-- Previous ultrasound in September 2017 showed cholelithiasis. He denies new abd pain or nausea after eating.         Past Medical History:   Diagnosis Date    BPH     Cholelithiasis     H. pylori infection 10/2017    Dr. Ro Aguilar Herpes     Hyperlipidemia     Hypertension     Obesity (BMI 30-39. 9) 8/18/2017    Primary osteoarthritis of right hip 7/17/2019    Psoriasis     Type II or unspecified type diabetes mellitus without mention of complication, not stated as uncontrolled        Prior to Visit Medications    Medication Sig Taking? Authorizing Provider   tamsulosin (FLOMAX) 0.4 MG capsule TAKE ONE CAPSULE BY MOUTH EVERY EVENING  Navid Rodrigez DO   simvastatin (ZOCOR) 20 MG tablet TAKE ONE TABLET BY MOUTH EVERY MORNING  Navid Rodrigez DO   lisinopril (PRINIVIL;ZESTRIL) 20 MG tablet TAKE ONE TABLET BY MOUTH DAILY  Navid Rodrigez DO   sitaGLIPtan-metformin (JANUMET)  MG per tablet Take 1 tablet by mouth 2 times daily (with meals)  Navid Rodrigez DO   Blood Glucose Monitoring Suppl (ACURA BLOOD GLUCOSE METER) w/Device KIT 1 Device by Does not apply route daily Please dispense insurance/pt preference. E11.9    Check blood sugars QD  Navid Rodrigez DO   senna-docusate (PERICOLACE) 8.6-50 MG per tablet Take 2 tablets by mouth 2 times daily  Historical Provider, MD   Cholecalciferol (VITAMIN D3) 1000 UNITS TABS Take 1 tablet by mouth daily.   Navid Rodrigez DO   FREESTYLE LITE strip TEST TWICE daily  Navid Rodrigez DO   aspirin EC 81 MG EC tablet Take 1 tablet by mouth daily. Navid Rodrigez, DO         Review of Systems   Constitutional: Negative for chills, fever and unexpected weight change. HENT: Negative for congestion, hearing loss, nosebleeds, sore throat and trouble swallowing. Eyes: Negative for visual disturbance. Respiratory: Negative for cough and shortness of breath. Cardiovascular: Negative for chest pain, palpitations and leg swelling. No claudication  No Orthopnea     Gastrointestinal: Negative for abdominal pain, diarrhea, nausea and vomiting. Genitourinary: Negative for difficulty urinating. Neurological: Positive for numbness. Negative for dizziness, light-headedness and headaches. Feet numbness occasionally. Psychiatric/Behavioral: Negative. PHYSICAL EXAMINATION:  [ INSTRUCTIONS:  \"[x]\" Indicates a positive item  \"[]\" Indicates a negative item  -- DELETE ALL ITEMS NOT EXAMINED]  Vital Signs: (As obtained by patient/caregiver or practitioner observation)    Vitals:    05/28/20 1104   BP: (!) 141/68   Pulse: 75   Temp: 97.1 °F (36.2 °C)   Weight: 260 lb (117.9 kg)     Body mass index is 34.3 kg/m². Wt Readings from Last 3 Encounters:   05/28/20 260 lb (117.9 kg)   12/06/19 270 lb (122.5 kg)   07/17/19 263 lb (119.3 kg)     BP Readings from Last 3 Encounters:   05/28/20 (!) 141/68   12/06/19 130/74   07/17/19 120/76           Physical Exam  Constitutional:       General: He is not in acute distress. Appearance: Normal appearance. He is obese. He is not ill-appearing. HENT:      Head: Normocephalic and atraumatic. Ears:      Comments: Skin lesion left ear     Mouth/Throat:      Mouth: Mucous membranes are moist.   Eyes:      Extraocular Movements: Extraocular movements intact. Conjunctiva/sclera: Conjunctivae normal.   Neck:      Musculoskeletal: Normal range of motion. Pulmonary:      Effort: Pulmonary effort is normal. No respiratory distress.    Abdominal:      General:

## 2020-06-02 LAB
ALBUMIN SERPL-MCNC: 4.6 G/DL
ALP BLD-CCNC: 57 U/L
ALT SERPL-CCNC: 40 U/L
ANION GAP SERPL CALCULATED.3IONS-SCNC: NORMAL MMOL/L
AST SERPL-CCNC: 23 U/L
AVERAGE GLUCOSE: 144
BILIRUB SERPL-MCNC: 0.5 MG/DL (ref 0.1–1.4)
BUN BLDV-MCNC: 14 MG/DL
CALCIUM SERPL-MCNC: 9.5 MG/DL
CHLORIDE BLD-SCNC: 103 MMOL/L
CHOLESTEROL, TOTAL: 155 MG/DL
CHOLESTEROL/HDL RATIO: 2.6
CO2: 28 MMOL/L
CREAT SERPL-MCNC: 0.85 MG/DL
GFR CALCULATED: NORMAL
GLUCOSE BLD-MCNC: 144 MG/DL
HBA1C MFR BLD: 6.9 %
HDLC SERPL-MCNC: 60 MG/DL (ref 35–70)
LDL CHOLESTEROL CALCULATED: 73 MG/DL (ref 0–160)
POTASSIUM SERPL-SCNC: 4.4 MMOL/L
SODIUM BLD-SCNC: 139 MMOL/L
TOTAL PROTEIN: 7.2
TRIGL SERPL-MCNC: 135 MG/DL
VLDLC SERPL CALC-MCNC: NORMAL MG/DL

## 2020-06-29 ENCOUNTER — TELEPHONE (OUTPATIENT)
Dept: INTERNAL MEDICINE CLINIC | Age: 70
End: 2020-06-29

## 2020-06-29 RX ORDER — LISINOPRIL 30 MG/1
30 TABLET ORAL DAILY
Qty: 90 TABLET | Refills: 1 | Status: SHIPPED | OUTPATIENT
Start: 2020-06-29 | Stop reason: SDUPTHER

## 2020-09-18 ENCOUNTER — TELEPHONE (OUTPATIENT)
Dept: INTERNAL MEDICINE CLINIC | Age: 70
End: 2020-09-18

## 2020-09-18 RX ORDER — BLOOD-GLUCOSE METER
KIT MISCELLANEOUS
Qty: 60 STRIP | Refills: 5 | Status: SHIPPED | OUTPATIENT
Start: 2020-09-18

## 2020-09-18 NOTE — TELEPHONE ENCOUNTER
Patient needs a prescription for free style   light strip sent to pharmacy as soons as possible. Patient cannot afford to purchase strips out of pocket and insurance won't cover it unless he gets a script from his provider. [address] [place] [address] [address] [facility], [address] [place] [address],[address] 1000 [facility] -  [phone]     Patient can be reached @ phone # provided should there be any questions.

## 2020-09-18 NOTE — TELEPHONE ENCOUNTER
----- Message from Meliton Renard sent at 9/18/2020 12:53 PM EDT -----  Subject: Message to Provider    QUESTIONS  Information for Provider? Patient needs a prescription for free style   light strip sent to pharmacy as soons as possible. Patient cannot afford   to purchase strips out of pocket and insurance won't cover it unless   patient has a script from his provider. ---------------------------------------------------------------------------  --------------  Lavelle VALDIVIA  What is the best way for the office to contact you? Do not leave any   message   patient will call back for answer  Preferred Call Back Phone Number? [phone]  ---------------------------------------------------------------------------  --------------  SCRIPT ANSWERS  Relationship to Patient?  Self

## 2020-11-20 LAB
AVERAGE GLUCOSE: NORMAL
BASOPHILS ABSOLUTE: 47 /ΜL
BASOPHILS RELATIVE PERCENT: 0.6 %
EOSINOPHILS ABSOLUTE: 237 /ΜL
EOSINOPHILS RELATIVE PERCENT: 3 %
HBA1C MFR BLD: 6.9 %
HCT VFR BLD CALC: 41.3 % (ref 41–53)
HEMOGLOBIN: 14.2 G/DL (ref 13.5–17.5)
LYMPHOCYTES ABSOLUTE: 1762 /ΜL
LYMPHOCYTES RELATIVE PERCENT: 22.3 %
MCH RBC QN AUTO: NORMAL PG
MCHC RBC AUTO-ENTMCNC: NORMAL G/DL
MCV RBC AUTO: NORMAL FL
MONOCYTES ABSOLUTE: 711 /ΜL
MONOCYTES RELATIVE PERCENT: 9 %
NEUTROPHILS ABSOLUTE: 5143 /ΜL
NEUTROPHILS RELATIVE PERCENT: 65.1 %
PLATELET # BLD: 226 K/ΜL
PMV BLD AUTO: NORMAL FL
RBC # BLD: 4.52 10^6/ΜL
TSH SERPL DL<=0.05 MIU/L-ACNC: 1.9 UIU/ML
WBC # BLD: 7.9 10^3/ML

## 2020-11-30 ENCOUNTER — OFFICE VISIT (OUTPATIENT)
Dept: INTERNAL MEDICINE CLINIC | Age: 70
End: 2020-11-30
Payer: MEDICARE

## 2020-11-30 VITALS
HEART RATE: 111 BPM | HEIGHT: 73 IN | DIASTOLIC BLOOD PRESSURE: 68 MMHG | RESPIRATION RATE: 12 BRPM | WEIGHT: 263 LBS | BODY MASS INDEX: 34.85 KG/M2 | TEMPERATURE: 96.1 F | SYSTOLIC BLOOD PRESSURE: 130 MMHG

## 2020-11-30 LAB
CREATININE URINE: 157.3 MG/DL (ref 39–259)
MICROALBUMIN UR-MCNC: 6.5 MG/DL
MICROALBUMIN/CREAT UR-RTO: 41.3 MG/G (ref 0–30)

## 2020-11-30 PROCEDURE — 1123F ACP DISCUSS/DSCN MKR DOCD: CPT | Performed by: INTERNAL MEDICINE

## 2020-11-30 PROCEDURE — 1036F TOBACCO NON-USER: CPT | Performed by: INTERNAL MEDICINE

## 2020-11-30 PROCEDURE — 3017F COLORECTAL CA SCREEN DOC REV: CPT | Performed by: INTERNAL MEDICINE

## 2020-11-30 PROCEDURE — 4040F PNEUMOC VAC/ADMIN/RCVD: CPT | Performed by: INTERNAL MEDICINE

## 2020-11-30 PROCEDURE — G8484 FLU IMMUNIZE NO ADMIN: HCPCS | Performed by: INTERNAL MEDICINE

## 2020-11-30 PROCEDURE — 90694 VACC AIIV4 NO PRSRV 0.5ML IM: CPT | Performed by: INTERNAL MEDICINE

## 2020-11-30 PROCEDURE — 99214 OFFICE O/P EST MOD 30 MIN: CPT | Performed by: INTERNAL MEDICINE

## 2020-11-30 PROCEDURE — G8427 DOCREV CUR MEDS BY ELIG CLIN: HCPCS | Performed by: INTERNAL MEDICINE

## 2020-11-30 PROCEDURE — 2022F DILAT RTA XM EVC RTNOPTHY: CPT | Performed by: INTERNAL MEDICINE

## 2020-11-30 PROCEDURE — G8417 CALC BMI ABV UP PARAM F/U: HCPCS | Performed by: INTERNAL MEDICINE

## 2020-11-30 PROCEDURE — 3044F HG A1C LEVEL LT 7.0%: CPT | Performed by: INTERNAL MEDICINE

## 2020-11-30 PROCEDURE — G0008 ADMIN INFLUENZA VIRUS VAC: HCPCS | Performed by: INTERNAL MEDICINE

## 2020-11-30 ASSESSMENT — PATIENT HEALTH QUESTIONNAIRE - PHQ9
SUM OF ALL RESPONSES TO PHQ QUESTIONS 1-9: 0
SUM OF ALL RESPONSES TO PHQ QUESTIONS 1-9: 0
1. LITTLE INTEREST OR PLEASURE IN DOING THINGS: 0
2. FEELING DOWN, DEPRESSED OR HOPELESS: 0
SUM OF ALL RESPONSES TO PHQ QUESTIONS 1-9: 0
SUM OF ALL RESPONSES TO PHQ9 QUESTIONS 1 & 2: 0

## 2020-11-30 NOTE — PROGRESS NOTES
[address] [facility]  Internal Medicine  Patient Encounter  Ling Barton D.O., [facility]         Chief Complaint   Patient presents with   Christina Adithya    Medication Check       HPI: 78 y.o. male seen today for follow up regarding the status of his current chronic medical problems listed below and med reconciliation. Diabetes Mellitus Type II, Follow-up--   Lab Results   Component Value Date    LABA1C 6.9 (H) 11/19/2020     Home sugars AM-- 130's-152.  2 hours after dinner-- 120-150. He denies hypoglycemia. He denies burning in feet. He does follow a strict low carb diet. Last Eye Exam-- 1/19/2018. No report of eye exam since 2018. He went in January 2020. U. Microalbumin/Cr--12/6/2019, repeat today  Pt is on ACEI-- Lisinopril  Complications-- None  Insulin Treated? No.    + ASA 81 mg daily  + Statin  Last LDL--84    HTN--Patient denies any new headaches, lightheadedness, syncope, chest pain, shortness of breath, orthopnea, swelling. He remains on 30 mg of lisinopril. He denies ACE inhibitor cough. Home blood pressure readings 120s-130s/70s    BPH--Patient remains on Flomax 0.4 mg daily. He denies any symptoms of slow stream, nocturia, frequency. He denies any hematuria. Hyperlipidemia:    Lab Results   Component Value Date    LDLCALC 84 11/19/2020     Gallstones-- Previous ultrasound in September 2017 showed cholelithiasis. He continues to deny abd pain or nausea after eating. Past Medical History:   Diagnosis Date    BPH     Cholelithiasis     H. pylori infection 10/2017    Dr. Corbin Rubalcava Herpes     Hyperlipidemia     Hypertension     Obesity (BMI 30-39. 9) 8/18/2017    Primary osteoarthritis of right hip 7/17/2019    Psoriasis     Type II or unspecified type diabetes mellitus without mention of complication, not stated as uncontrolled        Medication Sig   lisinopril (PRINIVIL;ZESTRIL) 30 MG tablet Take 1 tablet by mouth daily   tamsulosin (FLOMAX) 0.4 MG capsule TAKE ONE CAPSULE BY MOUTH EVERY EVENING   simvastatin (ZOCOR) 20 MG tablet TAKE ONE TABLET BY MOUTH EVERY MORNING   sitaGLIPtan-metformin (JANUMET)  MG per tablet Take 1 tablet by mouth 2 times daily (with meals)   senna-docusate (PERICOLACE) 8.6-50 MG per tablet Take 2 tablets by mouth 2 times daily   Cholecalciferol (VITAMIN D3) 1000 UNITS TABS Take 1 tablet by mouth daily. aspirin EC 81 MG EC tablet Take 1 tablet by mouth daily. blood glucose test strips (FREESTYLE LITE) strip TEST TWICE daily   Blood Glucose Monitoring Suppl (ACURA BLOOD GLUCOSE METER) w/Device KIT 1 Device by Does not apply route daily Please dispense insurance/pt preference. E11.9    Check blood sugars QD            Review of Systems - As per HPI  NEURO: Numbness right foot      OBJECTIVE:  /68   Pulse 111   Temp 96.1 °F (35.6 °C)   Resp 12   Ht 6' 1\" (1.854 m)   Wt 263 lb (119.3 kg)   BMI 34.70 kg/m²    Wt Readings from Last 3 Encounters:   11/30/20 263 lb (119.3 kg)   05/28/20 260 lb (117.9 kg)   12/06/19 270 lb (122.5 kg)     Down 7 pounds over the last year    GEN: NAD, A&O, Non-toxic, Obese  HEENT: NC/AT, CHAD, anicteric. NECK: Supple. No thyromegaly or nodules. No increased JVP. Trachea midline. LYMPH: No C/SC nodes. CV: Regular rhythm. Rate normal.  No murmur. No ectopy. Mode Schultz VASC: No carotid bruits, Pedal pulses symmetrical  PULM: CTA  GI: Abdomen is soft, nontender, nondistended. No masses. No hepatomegaly  EXT:No edema noted today. NEURO: Diminished monofilament sensation particularly in the right foot versus the left foot. Diminished vibratory sensation in both feet. MS:No synovitis. Knees with good ROM. SKIN: Feet normal color and temperature, no large calluses, ulcers or wounds. Dry, flaky skin BL feet. Mycotic toenails. ASSESSMENT/PLAN:    1.  Controlled type 2 diabetes with neuropathy ([address]  Diabetes is well controlled though would like to see the A1c a little lower  I encouraged the patient to work harder with low-carb diet and regular physical activity  Patient will continue his current medication  - Microalbumin / Creatinine Urine Ratio  -  DIABETES FOOT EXAM  - Comprehensive Metabolic Panel; Future  - Lipid Panel; Future  - Hemoglobin A1C; Future    2. Benign essential HTN  Blood pressure is fairly well controlled  Continue current medication  Continue to monitor blood pressure at home  - Comprehensive Metabolic Panel; Future  - Lipid Panel; Future    3. Hyperlipidemia, unspecified hyperlipidemia type  Condition is fairly well controlled  Patient should continue simvastatin. Monitor for adverse side effects  Encourage patient to work harder at dietary and exercise modification  - Comprehensive Metabolic Panel; Future  - Lipid Panel; Future    4. Flu vaccine need    - INFLUENZA, QUADV, ADJUVANTED, 65 YRS =, IM, PF, PREFILL SYR, 0.5ML (FLUAD)    5. Onychomycosis of toenail  We will refer to podiatry  - Amb External Referral To Podiatry    6.  Toe pain, chronic, unspecified laterality  Podiatry  - Amb External Referral To Podiatry

## 2020-11-30 NOTE — PATIENT INSTRUCTIONS
To do list:    #1 you need to do more with regular exercise/physical activity and work more on lower carbohydrate and low-fat diet.     #2 obtain your lab work just before your next visit    #3 see the foot specialist for nail and skin care issues with the feet

## 2020-11-30 NOTE — PROGRESS NOTES
Vaccine Information Sheet, \"Influenza - Inactivated\"  given to Mraanda Nogueira, or parent/legal guardian of  Maranda Nogueira and verbalized understanding. Patient responses:    Have you ever had a reaction to a flu vaccine? No  Do you have any current illness? No  Have you ever had [place] Syndrome? No  Do you have a serious allergy to any of the follow: Neomycin, Polymyxin, Thimerosal, eggs or egg products? No    Flu vaccine given per order. Please see immunization tab. Risks and benefits explained. Current VIS given.       Immunizations Administered     Name Date Dose Route    Influenza, Quadv, adjuvanted, 65 yrs +, IM, PF (Fluad) 11/30/2020 0.5 mL Intramuscular    Site: Deltoid- Left    Lot: [id number]    NDC: 64862-917-40

## 2020-12-20 RX ORDER — LISINOPRIL 30 MG/1
TABLET ORAL
Qty: 90 TABLET | Refills: 0 | Status: SHIPPED | OUTPATIENT
Start: 2020-12-20 | End: 2020-12-23

## 2020-12-23 RX ORDER — LISINOPRIL 30 MG/1
TABLET ORAL
Qty: 90 TABLET | Refills: 3 | Status: SHIPPED | OUTPATIENT
Start: 2020-12-23 | End: 2021-12-09 | Stop reason: SDUPTHER

## 2020-12-28 RX ORDER — SITAGLIPTIN AND METFORMIN HYDROCHLORIDE 500; 50 MG/1; MG/1
TABLET, FILM COATED ORAL
Qty: 180 TABLET | Refills: 1 | Status: SHIPPED | OUTPATIENT
Start: 2020-12-28 | End: 2021-06-29

## 2021-03-11 DIAGNOSIS — N40.1 BENIGN PROSTATIC HYPERPLASIA WITH WEAK URINARY STREAM: ICD-10-CM

## 2021-03-11 DIAGNOSIS — R39.12 BENIGN PROSTATIC HYPERPLASIA WITH WEAK URINARY STREAM: ICD-10-CM

## 2021-03-11 RX ORDER — TAMSULOSIN HYDROCHLORIDE 0.4 MG/1
CAPSULE ORAL
Qty: 60 CAPSULE | Refills: 1 | Status: SHIPPED | OUTPATIENT
Start: 2021-03-11 | End: 2021-07-02

## 2021-04-07 ENCOUNTER — OFFICE VISIT (OUTPATIENT)
Dept: INTERNAL MEDICINE CLINIC | Age: 71
End: 2021-04-07
Payer: MEDICARE

## 2021-04-07 VITALS
HEART RATE: 97 BPM | SYSTOLIC BLOOD PRESSURE: 120 MMHG | WEIGHT: 265 LBS | RESPIRATION RATE: 12 BRPM | BODY MASS INDEX: 35.12 KG/M2 | DIASTOLIC BLOOD PRESSURE: 75 MMHG | HEIGHT: 73 IN | TEMPERATURE: 97 F

## 2021-04-07 DIAGNOSIS — H26.9 CATARACT OF RIGHT EYE, UNSPECIFIED CATARACT TYPE: ICD-10-CM

## 2021-04-07 DIAGNOSIS — E11.40 CONTROLLED TYPE 2 DIABETES WITH NEUROPATHY (HCC): ICD-10-CM

## 2021-04-07 DIAGNOSIS — Z01.818 PREOP EXAM FOR INTERNAL MEDICINE: Primary | ICD-10-CM

## 2021-04-07 DIAGNOSIS — M16.11 PRIMARY OSTEOARTHRITIS OF RIGHT HIP: ICD-10-CM

## 2021-04-07 DIAGNOSIS — I10 BENIGN ESSENTIAL HTN: ICD-10-CM

## 2021-04-07 PROCEDURE — 1036F TOBACCO NON-USER: CPT | Performed by: INTERNAL MEDICINE

## 2021-04-07 PROCEDURE — G8427 DOCREV CUR MEDS BY ELIG CLIN: HCPCS | Performed by: INTERNAL MEDICINE

## 2021-04-07 PROCEDURE — 1123F ACP DISCUSS/DSCN MKR DOCD: CPT | Performed by: INTERNAL MEDICINE

## 2021-04-07 PROCEDURE — 99214 OFFICE O/P EST MOD 30 MIN: CPT | Performed by: INTERNAL MEDICINE

## 2021-04-07 PROCEDURE — 4040F PNEUMOC VAC/ADMIN/RCVD: CPT | Performed by: INTERNAL MEDICINE

## 2021-04-07 PROCEDURE — G8417 CALC BMI ABV UP PARAM F/U: HCPCS | Performed by: INTERNAL MEDICINE

## 2021-04-07 PROCEDURE — 2022F DILAT RTA XM EVC RTNOPTHY: CPT | Performed by: INTERNAL MEDICINE

## 2021-04-07 PROCEDURE — 3046F HEMOGLOBIN A1C LEVEL >9.0%: CPT | Performed by: INTERNAL MEDICINE

## 2021-04-07 PROCEDURE — 3017F COLORECTAL CA SCREEN DOC REV: CPT | Performed by: INTERNAL MEDICINE

## 2021-04-07 ASSESSMENT — PATIENT HEALTH QUESTIONNAIRE - PHQ9
SUM OF ALL RESPONSES TO PHQ9 QUESTIONS 1 & 2: 0
2. FEELING DOWN, DEPRESSED OR HOPELESS: 0
SUM OF ALL RESPONSES TO PHQ QUESTIONS 1-9: 0
1. LITTLE INTEREST OR PLEASURE IN DOING THINGS: 0

## 2021-04-07 NOTE — LETTER
[facility]  [address] [place] [address]  Phone: [phone]  Fax: [phone]    Temitope Robles DO        April 7, 2021     Patient: Tonie Puentes   YOB: 1950   Date of Visit: 4/7/2021       To Whom It May Concern: It is my medical opinion that Tonie Puentes requires a disability parking placard for the following reasons:  He has limited walking ability due to an orthopedic condition. Duration of need: 2 years    If you have any questions or concerns, please don't hesitate to call.     Sincerely,        Temitope Robles DO

## 2021-04-07 NOTE — PROGRESS NOTES
[facility]) Physicians  Internal Medicine  Patient Encounter  Augie Pierre D.O., [facility]          Chief Complaint   Patient presents with    Pre-op Exam     cataract rt eye CEI  4/20/21       HPI-- 78 y.o. male presents today for a preoperative physical.  Pt diagnosed with right eye cataract. Patient states that he has been having progressively worsening difficulty with visual acuity and night vision. He reports blurry or foggy vision. No difficulty at night with halos. He is having some difficulty with watching TV and reading. I have been asked to see patient for pre-operative risk assessment and clearance. Surgery scheduled for 4/20/2021 by Dr. Brandi Barajas. Other medical problems include type 2 diabetes, hypertension, hyperlipidemia. He also has a history of right hip osteoarthritis. He is requesting a handicap parking placard. He is compliant with his medications. His last A1c 11/19/2020 was 6.9% indicating good control of his diabetes. He is due for a recheck. His blood pressure is well controlled. He denies any symptoms suggestive of accelerated hypertension. He denies any headaches, dizziness, shortness of breath, lightheadedness, syncope, swelling. He denies any episodes of unilateral weakness, paresthesias, speech disturbances. He denies any worsening dysesthesias in the feet. Patient has received his COVID-19 vaccine. Medical/Surgical Histories     Past Medical History:   Diagnosis Date    BPH     Cholelithiasis     H. pylori infection 10/2017    Dr. Elijah Sanchez Herpes     Hyperlipidemia     Hypertension     Obesity (BMI 30-39. 9) 8/18/2017    Primary osteoarthritis of right hip 7/17/2019    Psoriasis     Type II or unspecified type diabetes mellitus without mention of complication, not stated as uncontrolled       No prior H/O DVT, PE or bleeding dyscrasias    Past Surgical History:   Procedure Laterality Date    COLONOSCOPY  2006    COLONOSCOPY  10/16/2017 dysphagia, throat infections, or dentures  RESPIRATORY:  Neg SOB ,wheeze, cough, sputum, hemoptysis. No report of + TB test.    CARDIOVASCULAR:  Neg Chest pain, palpitations, heart murmur, dyspnea on exertion, orthopnea, paroxysmal nocturnal dyspnea or edema of extremities, or claudication  GASTROINTESTINAL:  Neg   Nausea, vomiting, or diarrhea, constipation hematemesis, heart burn, dysphagia,change in bowel movements or stool caliber, hematochezia, melena, abdominal pain  GENITOURINARY:  Neg  Urinary frequency, hesitancy, urgency  HEMATOLOGIC/LYMPHATIC:  Neg  Anemia, bleeding dyscrasias, easy bruising, blood clots (DVT/PE), transfusions, or enlarged lymph nodes  MUSCULOSKELETAL: + Low back pain, + Right hip pain. NEUROLOGICAL:  Neg  Loss of Consciousness, memeory loss or forgetfulness, confusion, difficulty concentrating, seizures, insomina, aphasia or dysarthria, unilateral weakness or paresthesias, ataxia, headaches, syncope, tremor, or H/O head trauma. PSYCHIATRIC:  Neg  Depression, anxiety  SKIN :  Neg  Rash, nail changes, sun burns, tattoos, change in moles, or skin color changes  ENDOCRINE:  Neg  Polydipsia,polyuria,abnormal weight changes,heat /cold intolerance, Hair changes. No history of thyroid disease.  + History of diabetes       Physical Exam    Vitals:    04/07/21 1052   BP: 120/75   Pulse: 97   Resp: 12   Temp: 97 °F (36.1 °C)   Weight: 265 lb (120.2 kg)   Height: 6' 1\" (1.854 m)     Body mass index is 34.96 kg/m². Wt Readings from Last 3 Encounters:   04/07/21 265 lb (120.2 kg)   11/30/20 263 lb (119.3 kg)   05/28/20 260 lb (117.9 kg)     BP Readings from Last 3 Encounters:   04/07/21 120/75   11/30/20 130/68   05/28/20 (!) 141/68      GEN:  78 y.o. male who is in NAD, A&O. He appears stated age and well nourished. He is cooperative and pleasant. HEAD:  NC/AT, no lesions. EYES:  SHAUN, EOMI, No scleral icterus. OU Cataract, OD>>OS.     EARS:  EAC's clear, TM's normal.  NOSE:  Nasal cavity is clear. No mucosal congestion or discharge. Sinuses are nontender. MOUTH & THROAT:  Oral cavity is clear without mucosal lesions. Tongue is midline. Dentition is in good repair. No pharyngeal erythema or exudate. NECK:  Supple. Full ROM. Trachea is midline. No increased JVD. No thyromegaly or nodules. No masses  LYMPH: No C/SC/A/F nodes  CARDIAC:  S1S2 NL. Regular rhythm. No murmur/clicks/rubs. No ectopy. PMI is non-displaced. VASC:  Pedal pulses 2/4. Carotid upstrokes 2+. No bruits noted. PULM:  Lungs are CTA. Symmetric breath sounds noted. AP Diameter NL. GI:  Abdomen is soft and nontender. No distension. No organomegaly. No masses. No pulsatile masses. : Deferred at this time  BREAST:  Deferred at this time  EXT:  No Cyanosis or clubbing. No edema. SKIN: Warm and dry, normal turgor, no rash or lesions of concern. NEURO:  Cranial nerves 2-12 are NL. Speech fluent and coherent. Strength is 5/5 in all muscle groups. No sensory deficits. No focal or lateralizing deficits. Reflexes 2/4 and symmetric. Gait is normal.  MS:  No C/T/L paraspinal tenderness. No scoliosis. No joint effusions. Full joint ROM. PSYCH:  Mood and affect NL. Judgement and insight NL. Encounter Diagnoses   Name Primary?  Preop exam for internal medicine Yes    Cataract of right eye, unspecified cataract type     Controlled type 2 diabetes with neuropathy (HCC)--well-controlled. Due for lab     Benign essential HTN--well controlled. Check electrolytes and renal function     Primary osteoarthritis of right hip--handicap parking placard provided        Plan:  Acceptable risk for the planned procedure. No contraindications at this time    EKG-- Not needed  LAB-- Not needed.         Pt will avoid NSAID's, OTC vitamin supplements and fish oil 1 week prior to procedure          Electronically Signed:  Sarika Souza D.O      Copy of H&P given to pt and sent to Sx

## 2021-04-07 NOTE — PATIENT INSTRUCTIONS
To do list:      #1 call for blood work ahead of your next appointment in 6 months  Patient Education        Hip Arthritis: Care Instructions  Your Care Instructions     Arthritis, also called osteoarthritis, is a breakdown of the tissue (cartilage) that cushions your joints. Many people have some arthritis as they age. When the cartilage in your hip joints wears down, your hip bone rubs against the hip socket. This causes pain and stiffness. Work with your doctor to find the right mix of treatments for your arthritis. There are things you can do at home to protect your hip joints, ease your pain, and help you stay active. But if your arthritis becomes so bad that you cannot walk, you may need surgery to replace the hip joint. Follow-up care is a key part of your treatment and safety. Be sure to make and go to all appointments, and call your doctor if you are having problems. It's also a good idea to know your test results and keep a list of the medicines you take. How can you care for yourself at home? · Stay at a healthy weight. Being overweight puts extra strain on your hip joints. · Talk to your doctor or physical therapist about exercises that will help ease hip pain. These tips may help. ? Stretch to help prevent stiffness and to prevent injury before you exercise. You may enjoy gentle forms of yoga to help keep your joints and muscles flexible. ? Walk instead of jog. Other types of exercise that are less stressful on the joints include riding a bike, swimming, and doing water exercise. ? Lift weights. Strong muscles help reduce stress on your joints. Stronger thigh muscles, for example, take some of the stress off of the knees and hips. Learn the right way to lift weights so you do not make joint pain worse. · Take pain medicines exactly as directed. ? If the doctor gave you a prescription medicine for pain, take it as prescribed.   ? If you are not taking a prescription pain medicine, ask your doctor if you can take an over-the-counter medicine. · Use a cane, crutch, walker, or another device if you need help to get around. These can help rest your hips. You also can use other things to make life easier, such as a higher toilet seat. · Do not sit in low chairs, which can make it painful to get up. · Put heat or cold on your sore hips as needed. Use whichever helps you most. You also can go back and forth between hot and cold packs. ? Apply heat 2 or 3 times a day for 20 to 30 minutesusing a heating pad, hot shower, or hot packto relieve pain and stiffness. ? Put ice or a cold pack on your sore hips for 10 to 20 minutes at a time to numb the area. Put a thin cloth between the ice and your skin. · Think about talking to your doctor about using capsaicin, a cream you apply to the skin for pain relief. When should you call for help? Call your doctor now or seek immediate medical care if:    · You have sudden swelling, warmth, or pain in any joint.     · You have joint pain and a fever or rash.     · You have such bad pain that you cannot use the joint. Watch closely for changes in your health, and be sure to contact your doctor if:    · You have mild joint symptoms that continue even with more than 6 weeks of care at home.     · You do not get better as expected.     · You have stomach pain or other problems with your medicine. Where can you learn more? Go to [email or web address] [email or web address].[email or web address] org and sign in to your [employer] account. Enter I150 in the [facility] box to learn more about \"Hip Arthritis: Care Instructions. \"     If you do not have an account, please click on the \"Sign Up Now\" link. Current as of: August 5, 2020               Content Version: 12.8  © 0934-4993 [employer]. Care instructions adapted under license by [facility].  If you have questions about a medical condition or this instruction, always ask your healthcare professional. Jose Alejandre

## 2021-04-23 RX ORDER — SIMVASTATIN 20 MG
TABLET ORAL
Qty: 90 TABLET | Refills: 2 | Status: SHIPPED | OUTPATIENT
Start: 2021-04-23 | End: 2021-10-27

## 2021-06-29 RX ORDER — SITAGLIPTIN AND METFORMIN HYDROCHLORIDE 500; 50 MG/1; MG/1
TABLET, FILM COATED ORAL
Qty: 180 TABLET | Refills: 0 | Status: SHIPPED | OUTPATIENT
Start: 2021-06-29 | End: 2021-09-27

## 2021-07-02 DIAGNOSIS — R39.12 BENIGN PROSTATIC HYPERPLASIA WITH WEAK URINARY STREAM: ICD-10-CM

## 2021-07-02 DIAGNOSIS — N40.1 BENIGN PROSTATIC HYPERPLASIA WITH WEAK URINARY STREAM: ICD-10-CM

## 2021-07-02 RX ORDER — TAMSULOSIN HYDROCHLORIDE 0.4 MG/1
CAPSULE ORAL
Qty: 60 CAPSULE | Refills: 2 | Status: SHIPPED | OUTPATIENT
Start: 2021-07-02 | End: 2021-12-09 | Stop reason: SDUPTHER

## 2021-09-27 RX ORDER — SITAGLIPTIN AND METFORMIN HYDROCHLORIDE 500; 50 MG/1; MG/1
TABLET, FILM COATED ORAL
Qty: 180 TABLET | Refills: 0 | Status: SHIPPED | OUTPATIENT
Start: 2021-09-27 | End: 2021-12-09 | Stop reason: SDUPTHER

## 2021-10-07 ENCOUNTER — TELEPHONE (OUTPATIENT)
Dept: INTERNAL MEDICINE CLINIC | Age: 71
End: 2021-10-07

## 2021-10-07 DIAGNOSIS — E78.5 HYPERLIPIDEMIA, UNSPECIFIED HYPERLIPIDEMIA TYPE: ICD-10-CM

## 2021-10-07 DIAGNOSIS — Z12.5 SCREENING FOR PROSTATE CANCER: ICD-10-CM

## 2021-10-07 DIAGNOSIS — I10 BENIGN ESSENTIAL HTN: ICD-10-CM

## 2021-10-07 DIAGNOSIS — E11.40 CONTROLLED TYPE 2 DIABETES WITH NEUROPATHY (HCC): Primary | ICD-10-CM

## 2021-10-07 NOTE — TELEPHONE ENCOUNTER
Patient is requesting an order be mailed to him for labs deemed necessary by Dr. Shanika Grover, to be done prior to his upcoming appointment scheduled toward the end of this month.     Please mail to:    Vernida Haymaker [phone]

## 2021-10-20 ENCOUNTER — TELEPHONE (OUTPATIENT)
Dept: INTERNAL MEDICINE CLINIC | Age: 71
End: 2021-10-20

## 2021-10-20 NOTE — TELEPHONE ENCOUNTER
----- Message from Karine Worthington sent at 10/20/2021 11:41 AM EDT -----  Subject: Message to Provider    QUESTIONS  Information for Provider? Patient is needing their bloodwork orders mailed   to them so that they can complete them before their appointment  ---------------------------------------------------------------------------  --------------  [address]  What is the best way for the office to contact you? OK to leave message on   voicemail  Preferred Call Back Phone Number? [phone]  ---------------------------------------------------------------------------  --------------  SCRIPT ANSWERS  Relationship to Patient?  Self

## 2021-10-25 ENCOUNTER — OFFICE VISIT (OUTPATIENT)
Dept: INTERNAL MEDICINE CLINIC | Age: 71
End: 2021-10-25
Payer: MEDICARE

## 2021-10-25 VITALS
BODY MASS INDEX: 34.85 KG/M2 | RESPIRATION RATE: 14 BRPM | WEIGHT: 263 LBS | HEIGHT: 73 IN | HEART RATE: 84 BPM | OXYGEN SATURATION: 98 % | DIASTOLIC BLOOD PRESSURE: 68 MMHG | SYSTOLIC BLOOD PRESSURE: 126 MMHG

## 2021-10-25 DIAGNOSIS — Z23 NEED FOR PROPHYLACTIC VACCINATION AND INOCULATION AGAINST VARICELLA: ICD-10-CM

## 2021-10-25 DIAGNOSIS — Z13.6 SCREENING FOR CARDIOVASCULAR CONDITION: ICD-10-CM

## 2021-10-25 DIAGNOSIS — I10 BENIGN ESSENTIAL HTN: ICD-10-CM

## 2021-10-25 DIAGNOSIS — E11.40 CONTROLLED TYPE 2 DIABETES WITH NEUROPATHY (HCC): ICD-10-CM

## 2021-10-25 DIAGNOSIS — E78.5 HYPERLIPIDEMIA, UNSPECIFIED HYPERLIPIDEMIA TYPE: ICD-10-CM

## 2021-10-25 DIAGNOSIS — Z23 NEED FOR PROPHYLACTIC VACCINATION AGAINST DIPHTHERIA-TETANUS-PERTUSSIS (DTP): ICD-10-CM

## 2021-10-25 DIAGNOSIS — Z00.00 ROUTINE GENERAL MEDICAL EXAMINATION AT A HEALTH CARE FACILITY: Primary | ICD-10-CM

## 2021-10-25 PROCEDURE — 3044F HG A1C LEVEL LT 7.0%: CPT | Performed by: INTERNAL MEDICINE

## 2021-10-25 PROCEDURE — 3017F COLORECTAL CA SCREEN DOC REV: CPT | Performed by: INTERNAL MEDICINE

## 2021-10-25 PROCEDURE — G0446 INTENS BEHAVE THER CARDIO DX: HCPCS | Performed by: INTERNAL MEDICINE

## 2021-10-25 PROCEDURE — 1123F ACP DISCUSS/DSCN MKR DOCD: CPT | Performed by: INTERNAL MEDICINE

## 2021-10-25 PROCEDURE — G0438 PPPS, INITIAL VISIT: HCPCS | Performed by: INTERNAL MEDICINE

## 2021-10-25 PROCEDURE — 4040F PNEUMOC VAC/ADMIN/RCVD: CPT | Performed by: INTERNAL MEDICINE

## 2021-10-25 PROCEDURE — G8484 FLU IMMUNIZE NO ADMIN: HCPCS | Performed by: INTERNAL MEDICINE

## 2021-10-25 ASSESSMENT — LIFESTYLE VARIABLES: HOW OFTEN DO YOU HAVE A DRINK CONTAINING ALCOHOL: 0

## 2021-10-25 NOTE — PROGRESS NOTES
[facility]) Physicians  Internal Medicine  Patient Encounter  Andre Klein D.O., [facility]     Medicare Annual Wellness Visit  Name: Nain Jarvis Date: 10/25/2021   MRN: <[id number]> Sex: Male   Age: 73 y.o. Ethnicity: Non- / Non    : 1950 Race: White (non-)      Sidney Felty is here for Medicare AWV    Screenings for behavioral, psychosocial and functional/safety risks, and cognitive dysfunction are all negative except as indicated below. These results, as well as other patient data from the [address] form, are documented in Flowsheets linked to this Encounter. Medical/Surgical Histories     Past Medical History:   Diagnosis Date    BPH     Cholelithiasis     H. pylori infection 10/2017    Dr. Nita Sam Herpes     Hyperlipidemia     Hypertension     Obesity (BMI 30-39. 9) 2017    Primary osteoarthritis of right hip 2019    Psoriasis     Type II or unspecified type diabetes mellitus without mention of complication, not stated as uncontrolled           Past Surgical History:   Procedure Laterality Date    COLONOSCOPY  2006    COLONOSCOPY  10/16/2017    Dr. Brian Segovia  2019    Dr. Francesco Weeks  10/16/2017    Dr. Eduardo Mata           Medications/Allergies     Medication Sig    Tdap (ADACEL) 5-2-15.5 LF-MCG/0.5 injection Inject 0.5 mLs into the muscle once for 1 dose    zoster recombinant adjuvanted vaccine [facility]) 50 MCG/0.5ML SUSR injection Inject 0.5 mLs into the muscle once for 1 dose .   Repeat in 2 to 6 months    JANUMET  MG per tablet TAKE ONE TABLET BY MOUTH TWICE A DAY WITH MEALS    tamsulosin (FLOMAX) 0.4 MG capsule TAKE ONE CAPSULE BY MOUTH EVERY EVENING    simvastatin (ZOCOR) 20 MG tablet TAKE ONE TABLET BY MOUTH EVERY MORNING    lisinopril (PRINIVIL;ZESTRIL) 30 MG tablet TAKE ONE TABLET BY MOUTH DAILY    blood glucose test strips (FREESTYLE LITE) strip TEST TWICE daily    Blood Glucose Monitoring Suppl (ACURA BLOOD GLUCOSE METER) w/Device KIT 1 Device by Does not apply route daily Please dispense insurance/pt preference. E11.9    Check blood sugars QD    senna-docusate (PERICOLACE) 8.6-50 MG per tablet Take 2 tablets by mouth 2 times daily    Cholecalciferol (VITAMIN D3) 1000 UNITS TABS Take 1 tablet by mouth daily.  aspirin EC 81 MG EC tablet Take 1 tablet by mouth daily. No Known Allergies      Substance Use History     Social History     Tobacco Use    Smoking status: Never Smoker    Smokeless tobacco: Never Used   Substance Use Topics    Alcohol use: Yes     Comment: occ    Drug use: No          Family History     Family History   Problem Relation Age of Onset    Cancer Mother         colon    Diabetes Mother     Cancer Father         colon    Cancer Brother         todd Demarco (Including outside providers/suppliers regularly involved in providing care):   Patient Care Team:  Giancarlo Quiroga DO as PCP - General (Internal Medicine)  Giancarlo Quiroga DO as PCP - [facility] Empaneled Provider  Bg Wade MD as Surgeon (General Surgery)      Based upon direct observation of the patient, evaluation of cognition reveals recent and remote memory intact. ROS:  HEENT: Right ear decreased hearing. He does not want testing. ENDO:  Blood sugars 120's-140's. Made some diet changes. Physical Exam    Vitals:    10/25/21 1024 10/25/21 1133 10/25/21 1134   BP: 102/84 126/84 126/68   Site:  Right Upper Arm Left Upper Arm   Pulse: 84     Resp: 14     SpO2: 98%     Weight: 263 lb (119.3 kg)     Height: 6' 1\" (1.854 m)       Body mass index is 34.7 kg/m².      Wt Readings from Last 3 Encounters:   10/25/21 263 lb (119.3 kg)   04/07/21 265 lb (120.2 kg)   11/30/20 263 lb (119.3 kg)     BP Readings from Last 3 Encounters:   10/25/21 126/68   04/07/21 120/75   11/30/20 130/68      GEN:  73 y.o. male who is in NAD, A&O. He appears stated age and well nourished. He is cooperative and pleasant. Obese  HEAD:  NC/AT, no lesions. EYES:  SHAUN, EOMI, No scleral icterus or conjunctival injection or discharge. Visual fields in tact to confrontation. EARS:  EAC's clear, TM's normal.  NECK:  Supple. Full ROM. Trachea is midline. No increased JVD. No thyromegaly or nodules. No masses  CARDIAC:  S1S2 NL. Regular rhythm. No murmur/clicks/rubs. No ectopy. PMI is non-displaced. VASC:  Pedal pulses 2/4. Carotid upstrokes 2+. No bruits noted. PULM:  Lungs are CTA. Symmetric breath sounds noted. AP Diameter NL. GI:  Abdomen is soft and nontender. No distension. No organomegaly. No masses. No pulsatile masses. + Umbilical hernia.  + rectus diastasis. EXT:  No Cyanosis or clubbing. No edema. SKIN: Warm and dry, normal turgor, no rash or lesions of concern. PSYCH:  Mood and affect NL. Judgement and insight NL. Patient's complete Health Risk Assessment and screening values have been reviewed and are found in Flowsheets. The following problems were reviewed today and where indicated follow up appointments were made and/or referrals ordered. Positive Risk Factor Screenings with Interventions:            General Health and ACP:  General  In general, how would you say your health is?: Good  In the past 7 days, have you experienced any of the following? New or Increased Pain, New or Increased Fatigue, Loneliness, Social Isolation, Stress or Anger?: None of These  Do you get the social and emotional support that you need?: (!) No  Do you have a Living Will?: (!) No  Advance Directives     Power of [facility] Will ACP-Advance Directive ACP-Power of     Not on File Not on File Not on File Not on File      General Health Risk Interventions:  · Social isolation: Pt did not understand question.   He has good support  · No Living Will: Advance Care Planning addressed with patient today    Health Habits/Nutrition:  Health Habits/Nutrition  Do you exercise for at least 20 minutes 2-3 times per week?: (!) No  Have you lost any weight without trying in the past 3 months?: No  Do you eat only one meal per day?: No  Have you seen the dentist within the past year?: Yes  Body mass index: (!) 34.69  Health Habits/Nutrition Interventions:  · Inadequate physical activity:  Patient counseled on the need to improve physical activity  · Nutritional issues:  educational materials for healthy, well-balanced diet provided    Hearing/Vision:  No exam data present  Hearing/Vision  Do you or your family notice any trouble with your hearing that hasn't been managed with hearing aids?: (!) Yes  Do you have difficulty driving, watching TV, or doing any of your daily activities because of your eyesight?: No  Have you had an eye exam within the past year?: Yes  Hearing/Vision Interventions:  · Hearing concerns:  Recommended patient undergo hearing evaluation. Declines    Safety:  Safety  Do you have working smoke detectors?: (!) No  Have all throw rugs been removed or fastened?: (!) No  Do you have non-slip mats or surfaces in all bathtubs/showers?: (!) No  Do all of your stairways have a railing or banister?: (!) No  Are your doorways, halls and stairs free of clutter?: (!) No  Do you always fasten your seatbelt when you are in a car?: (!) No  Safety Interventions:  · Home safety tips provided-- Pt did not understand the questions. He does have railings in stairwell. He does have smoke detectors.         Personalized Preventive Plan   Current Health Maintenance Status  Immunization History   Administered Date(s) Administered    COVID-19, Moderna, PF, 100mcg/0.5mL 02/10/2021, 03/10/2021    Influenza, Quadv, adjuvanted, 65 yrs +, IM, PF (Fluad) 11/30/2020    Pneumococcal Conjugate 13-valent ([id number]) 10/26/2018    Pneumococcal Polysaccharide ([id number]) 12/08/2016        Health Maintenance   Topic Date Due    DTaP/Tdap/Td vaccine (1 - Tdap) Never done    Shingles Vaccine (1 of 2) Never done    Annual Wellness Visit (AWV)  Never done    Flu vaccine (1) 10/25/2022 (Originally 9/1/2021)    Diabetic foot exam  11/30/2021    Diabetic microalbuminuria test  11/30/2021    Pneumococcal 65+ years Vaccine (2 of 2 - PPSV23) 12/08/2021    Colon cancer screen colonoscopy  10/16/2022    A1C test (Diabetic or Prediabetic)  10/21/2022    Lipid screen  10/21/2022    Potassium monitoring  10/21/2022    Creatinine monitoring  10/21/2022    Diabetic retinal exam  04/05/2023    COVID-19 Vaccine  Completed    Hepatitis C screen  Completed    Hepatitis A vaccine  Aged Out    Hib vaccine  Aged Out    Meningococcal (ACWY) vaccine  Aged Out     Recommendations for [facility] Due: see orders and patient instructions/AVS.  . Recommended screening schedule for the next 5-10 years is provided to the patient in written form: see Patient Instructions/AVS.    Bertramda Jefferson was seen today for medicare awv. Diagnoses and all orders for this visit:    Routine general medical examination at a health care facility  --All care gaps identified and addressed  --Literature provided on advance care planning  --Counseled patient on the importance of needed vaccines including Tdap, Shingrix, and COVID-19 vaccine booster  -     Tdap (ADACEL) 5-2-15.5 LF-MCG/0.5 injection; Inject 0.5 mLs into the muscle once for 1 dose  -     zoster recombinant adjuvanted vaccine [facility]) 50 MCG/0.5ML SUSR injection; Inject 0.5 mLs into the muscle once for 1 dose . 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)  -     Microalbumin / Creatinine Urine Ratio  -     Comprehensive Metabolic Panel; Future  -     Lipid Panel;  Future  -     Hemoglobin A1C; Future    Screening for cardiovascular condition  -     UT Intens behave ther cardio dx, 15 minutes []    Need for prophylactic vaccination against diphtheria-tetanus-pertussis (DTP)  -     Tdap (ADACEL) 5-2-15.5 LF-MCG/0.5 injection; Inject 0.5 mLs into the muscle once for 1 dose    Need for prophylactic vaccination and inoculation against varicella  -     zoster recombinant adjuvanted vaccine [facility]) 50 MCG/0.5ML SUSR injection; Inject 0.5 mLs into the muscle once for 1 dose . Repeat in 2 to 6 months    Benign essential HTN  -     Comprehensive Metabolic Panel; Future  -     Lipid Panel; Future    Hyperlipidemia, unspecified hyperlipidemia type  -     Comprehensive Metabolic Panel; Future  -     Lipid Panel; Future                 Cardiovascular Disease Risk Counseling: Assessed the patient's risk to develop cardiovascular disease and reviewed main risk factors. Reviewed steps to reduce disease risk including:   · Quitting tobacco use, reducing amount smoked, or not starting the habit--does not smoke  · Making healthy food choices  · Being physically active and gradualy increasing activity levels   · Reduce weight and determine a healthy BMI goal  · Monitor blood pressure and treat if higher than 140/90 mmHg  · Maintain blood total cholesterol levels under 5 mmol/l or 190 mg/dl  · Maintain LDL cholesterol levels under 3.0 mmol/l or 115 mg/dl   · Control blood glucose levels  · Consider taking aspirin (75 mg daily), once blood pressure is controlled   Provided a follow up plan.   Time spent (minutes): 15 minutes

## 2021-10-25 NOTE — PATIENT INSTRUCTIONS
Advance Directives: Care Instructions  Overview  An advance directive is a legal way to state your wishes at the end of your life. It tells your family and your doctor what to do if you can't say what you want. There are two main types of advance directives. You can change them any time your wishes change. Living will. This form tells your family and your doctor your wishes about life support and other treatment. The form is also called a declaration. Medical power of . This form lets you name a person to make treatment decisions for you when you can't speak for yourself. This person is called a health care agent (health care proxy, health care surrogate). The form is also called a durable power of  for health care. If you do not have an advance directive, decisions about your medical care may be made by a family member, or by a doctor or a  who doesn't know you. It may help to think of an advance directive as a gift to the people who care for you. If you have one, they won't have to make tough decisions by themselves. Follow-up care is a key part of your treatment and safety. Be sure to make and go to all appointments, and call your doctor if you are having problems. It's also a good idea to know your test results and keep a list of the medicines you take. What should you include in an advance directive? Many states have a unique advance directive form. (It may ask you to address specific issues.) Or you might use a universal form that's approved by many states. If your form doesn't tell you what to address, it may be hard to know what to include in your advance directive. Use the questions below to help you get started. · Who do you want to make decisions about your medical care if you are not able to? · What life-support measures do you want if you have a serious illness that gets worse over time or can't be cured? · What are you most afraid of that might happen? (Maybe you're afraid of having pain, losing your independence, or being kept alive by machines.)  · Where would you prefer to die? (Your home? A hospital? A nursing home?)  · Do you want to donate your organs when you die? · Do you want certain Jew practices performed before you die? When should you call for help? Be sure to contact your doctor if you have any questions. Where can you learn more? Go to https://[email or web address] org and sign in to your [employer] account. Enter R264 in the Synlogic box to learn more about \"Advance Directives: Care Instructions. \"     If you do not have an account, please click on the \"Sign Up Now\" link. Current as of: March 17, 2021               Content Version: 13.0  © 2006-2021 Healthwise, [employer]. Care instructions adapted under license by [facility] [place] ([facility]). If you have questions about a medical condition or this instruction, always ask your healthcare professional. [place] [address] any warranty or liability for your use of this information. Learning About Medical Power of   What is a medical power of ? A medical power of , also called a durable power of  for health care, is one type of the legal forms called advance directives. It lets you name the person you want to make treatment decisions for you if you can't speak or decide for yourself. The person you choose is called your health care agent. This person is also called a health care proxy or health care surrogate. A medical power of  may be called something else in your state. How do you choose a health care agent? Choose your health care agent carefully. This person may or may not be a family member. Talk to the person before you make your final decision. Make sure he or she is comfortable with this responsibility. It's a good idea to choose someone who:  · Is at least 25years old.   · Knows you well and understands what makes life meaningful for you. · Understands your Yazidism and moral values. · Will do what you want, not what he or she wants. · Will be able to make difficult choices at a stressful time. · Will be able to refuse or stop treatment, if that is what you would want, even if you could die. · Will be firm and confident with health professionals if needed. · Will ask questions to get needed information. · Lives near you or agrees to travel to you if needed. Your family may help you make medical decisions while you can still be part of that process. But it's important to choose one person to be your health care agent in case you aren't able to make decisions for yourself. If you don't fill out the legal form and name a health care agent, the decisions your family can make may be limited. A health care agent may be called something else in your state. Who will make decisions for you if you don't have a health care agent? If you don't have a health care agent or a living will, you may not get the care you want. Decisions may be made by family members who disagree about your medical care. Or decisions may be made by a medical professional who doesn't know you well. In some cases, a  makes the decisions. When you name a health care agent, it is very clear who has the power to make health decisions for you. How do you name a health care agent? You name your health care agent on a legal form. This form is usually called a medical power of . Ask your hospital, state bar association, or office on aging where to find these forms. You must sign the form to make it legal. Some states require you to get the form notarized. This means that a person called a  watches you sign the form and then he or she signs the form. Some states also require that two or more witnesses sign the form. Be sure to tell your family members and doctors who your health care agent is.   Where can living will. · Some states may limit your right to refuse treatment in certain cases. For example, you may need to clearly state in your living will that you don't want artificial hydration and nutrition, such as being fed through a tube. Is a living will a legal document? A living will is a legal document. Each state has its own laws about living velazquez. And a living will may be called something else in your state. Here are some things to know about living velazquez. · You don't need an  to complete a living will. But legal advice can be helpful if your state's laws are unclear. It can also help if your health history is complicated or your family can't agree on what should be in your living will. · You can change your living will at any time. Some people find that their wishes about end-of-life care change as their health changes. If you make big changes to your living will, complete a new form. · If you move to another state, make sure that your living will is legal in the state where you now live. In most cases, doctors will respect your wishes even if you have a form from a different state. · You might use a universal form that has been approved by many states. This kind of form can sometimes be filled out and stored online. Your digital copy will then be available wherever you have a connection to the internet. The doctors and nurses who need to treat you can find it right away. · Your state may offer an online registry. This is another place where you can store your living will online. · It's a good idea to get your living will notarized. This means using a person called a  to watch two people sign, or witness, your living will. What should you know when you create a living will? Here are some questions to ask yourself as you make your living will:  · Do you know enough about life support methods that might be used?  If not, talk to your doctor so you know what might be done if you Learning About Cutting Calories  How do calories affect your weight? Food gives your body energy. Energy from the food you eat is measured in calories. This energy keeps your heart beating, your brain active, and your muscles working. Your body needs a certain number of calories each day. After your body uses the calories it needs, it stores extra calories as fat. To lose weight safely, you have to eat fewer calories while eating in a healthy way. How many calories do you need each day? The more active you are, the more calories you need. When you are less active, you need fewer calories. How many calories you need each day also depends on several things, including your age and whether you are male or female. Here are some general guidelines for adults:  · Less active women and older adults need 1,600 to 2,000 calories each day. · Active women and less active men need 2,000 to 2,400 calories each day. · Active men need 2,400 to 3,000 calories each day. How can you cut calories and eat healthy meals? Whole grains, vegetables and fruits, and dried beans are good lower-calorie foods. They give you lots of nutrients and fiber. And they fill you up. Sweets, energy drinks, and soda pop are high in calories. They give you few nutrients and no fiber. Try to limit soda pop, fruit juice, and energy drinks. Drink water instead. Some fats can be part of a healthy diet. But cutting back on fats from highly processed foods like fast foods and many snack foods is a good way to lower the calories in your diet. Also, use smaller amounts of fats like butter, margarine, salad dressing, and mayonnaise. Add fresh garlic, lemon, or herbs to your meals to add flavor without adding fat. Meats and dairy products can be a big source of hidden fats. Try to choose lean or low-fat versions of these products. Fat-free cookies, candies, chips, and frozen treats can still be high in sugar and calories.  Some fat-free foods have more calories than regular ones. Eat fat-free treats in moderation, as you would other foods. If your favorite foods are high in fat, salt, sugar, or calories, limit how often you eat them. Eat smaller servings, or look for healthy substitutes. Fill up on fruits, vegetables, and whole grains. Eating at home  · Use meat as a side dish instead of as the main part of your meal.  · Try main dishes that use whole wheat pasta, brown rice, dried beans, or vegetables. · Find ways to cook with little or no fat, such as broiling, steaming, or grilling. · Use cooking spray instead of oil. If you use oil, use a monounsaturated oil, such as canola or olive oil. · Trim fat from meats before you cook them. · Drain off fat after you brown the meat or while you roast it. · Chill soups and stews after you cook them. Then skim the fat off the top after it hardens. Eating out  · Order foods that are broiled or poached rather than fried or breaded. · Cut back on the amount of butter or margarine that you use on bread. · Order sauces, gravies, and salad dressings on the side, and use only a little. · When you order pasta, choose tomato sauce rather than cream sauce. · Ask for salsa with your baked potato instead of sour cream, butter, cheese, or villavicencio. · Order meals in a small size instead of upgrading to a large. · Share an entree, or take part of your food home to eat as another meal.  · Share appetizers and desserts. Where can you learn more? Go to https://Schematic Labskitty.healthBrand [employer]inity Technologies. org and sign in to your zPerfectGift account. Enter J202 in the StyleCaster box to learn more about \"Learning About Cutting Calories. \"     If you do not have an account, please click on the \"Sign Up Now\" link. Current as of: December 17, 2020               Content Version: 13.0  © 6820-0885 Healthwise, Incorporated. Care instructions adapted under license by [facility] ([facility]).  If you have questions about a medical condition or this instruction, always ask your healthcare professional. [place] [address] any warranty or liability for your use of this information. Learning About Healthy Weight  What is a healthy weight? A healthy weight is the weight at which you feel good about yourself and have energy for work and play. It's also one that lowers your risk for health problems. What can you do to stay at a healthy weight? It can be hard to stay at a healthy weight, especially when fast food, vending-machine snacks, and processed foods are so easy to find. And with your busy lifestyle, activity may be low on your list of things to do. But staying at a healthy weight may be easier than you think. Here are some dos and don'ts for staying at a healthy weight. Do eat healthy foods  The kinds of foods you eat have a big impact on both your weight and your health. Reaching and staying at a healthy weight is not about going on a diet. It's about making healthier food choices every day and changing your diet for good. Healthy eating means eating a variety of foods so that you get all the nutrients you need. Your body needs protein, carbohydrate, and fats for energy. They keep your heart beating, your brain active, and your muscles working. On most days, try to eat from each food group. This means eating a variety of:  · Whole grains, such as whole wheat breads and pastas. · Fruits and vegetables. · Dairy products, such as low-fat milk, yogurt, and cheese. · Lean proteins, such as all types of fish, chicken without the skin, and beans. Don't have too much or too little of one thing. All foods, if eaten in moderation, can be part of healthy eating. Even sweets can be okay. If your favorite foods are high in fat, salt, sugar, or calories, limit how often you eat them. Eat smaller servings, or look for healthy substitutes. Do watch what you eat  Many people eat more than their bodies need.  Part of staying at a healthy weight means learning how much food you really need from day to day and not eating more than that. Even with healthy foods, eating too much can make you gain weight. Having a well-balanced diet means that you eat enough, but not too much, and that your food gives you the nutrients you need to stay healthy. So listen to your body. Eat when you're hungry. Stop when you feel satisfied. It's a good idea to have healthy snacks ready for when you get hungry. Keep healthy snacks with you at work, in your car, and at home. If you have a healthy snack easily available, you'll be less likely to pick a candy bar or bag of chips from a vending machine instead. Some healthy snacks you might want to keep on hand are fruit, low-fat yogurt, string cheese, low-fat microwave popcorn, raisins and other dried fruit, nuts, whole wheat crackers, pretzels, carrots, celery sticks, and broccoli. Do some physical activity  A big part of reaching and staying at a healthy weight is being active. When you're active, you burn calories. This makes it easier to reach and stay at a healthy weight. When you're active on a regular basis, your body burns more calories, even when you're at rest. Being active helps you lose fat and build lean muscle. Try to be active for at least 1 hour every day. This may sound like a lot, but it's okay to be active in smaller blocks of time that add up to 1 hour a day. Any activity that makes your heart beat faster and keeps it there for a while counts. A brisk walk, run, or swim will get your heart beating faster. So will climbing stairs, shooting baskets, or cycling. Even some household chores like vacuuming and mowing the lawn will get your heart rate up. Pick activities that you enjoyones that make your heart beat faster, your muscles stronger, and your muscles and joints more flexible. If you find more than one thing you like doing, do them all.  You don't have to do the same thing every day.  Don't diet  Diets don't work. Diets are temporary. Because you give up so much when you diet, you may be hungry and think about food all the time. And after you stop dieting, you also may overeat to make up for what you missed. Most people who diet end up gaining back the pounds they lostand more. Remember that healthy bodies come in lots of shapes and sizes. Everyone can get healthier by eating better and being more active. Where can you learn more? Go to https://PercellopeScoutforce.CombaGroup. org and sign in to your DailyDeal account. Enter 615 [id number] in the Flumes box to learn more about \"Learning About Healthy Weight. \"     If you do not have an account, please click on the \"Sign Up Now\" link. Current as of: March 17, 2021               Content Version: 13.0  © 7828-0591 [employer]ee. Care instructions adapted under license by [facility] ([facility]). If you have questions about a medical condition or this instruction, always ask your healthcare professional. [address] any warranty or liability for your use of this information. Learning About Low-Carbohydrate Diets  What is a low-carbohydrate diet? A low-carbohydrate (or \"low-carb\") diet limits foods and drinks that have carbohydrates. This includes grains, fruits, milk and yogurt, and starchy vegetables like potatoes, beans, and corn. It also avoids foods and drinks that have added sugar. Instead, low-carb diets include foods that are high in protein and fat. Why might you follow a low-carb diet? Low-carb diets may be used for a variety of reasons, such as for weight loss. People who have diabetes may use a low-carb diet to help manage their blood sugar levels. What should you do before you start the diet? Talk to your doctor before you try any diet. This is even more important if you have health problems like kidney disease, heart disease, or diabetes.  Your doctor may suggest that you meet with a registered dietitian. A dietitian can help you make an eating plan that works for you. What foods do you eat on a low-carb diet? On a low-carb diet, you choose foods that are high in protein and fat. Examples of these are:  · Meat, poultry, and fish. · Eggs. · Nuts, such as walnuts, pecans, almonds, and peanuts. · Peanut butter and other nut butters. · Tofu. · Avocado. · Krissy [place]. · Non-starchy vegetables like broccoli, cauliflower, green beans, mushrooms, peppers, lettuce, and spinach. · Unsweetened non-dairy milks like almond milk and coconut milk. · Cheese, cottage cheese, and cream cheese. Current as of: December 17, 2020               Content Version: 13.0  © 5767-8632 Healthwise, [employer]. Care instructions adapted under license by [facility] ([facility]). If you have questions about a medical condition or this instruction, always ask your healthcare professional. [place] [address] any warranty or liability for your use of this information. Personalized Preventive Plan for Fani Glasgow - 10/25/2021  Medicare offers a range of preventive health benefits. Some of the tests and screenings are paid in full while other may be subject to a deductible, co-insurance, and/or copay. Some of these benefits include a comprehensive review of your medical history including lifestyle, illnesses that may run in your family, and various assessments and screenings as appropriate. After reviewing your medical record and screening and assessments performed today your provider may have ordered immunizations, labs, imaging, and/or referrals for you. A list of these orders (if applicable) as well as your Preventive Care list are included within your After Visit Summary for your review. Other Preventive Recommendations:    · A preventive eye exam performed by an eye specialist is recommended every 1-2 years to screen for glaucoma; cataracts, macular degeneration, and other eye disorders.   · A preventive dental visit is recommended every 6 months. · Try to get at least 150 minutes of exercise per week or 10,000 steps per day on a pedometer . · Order or download the FREE \"Exercise & Physical Activity: Your Everyday Guide\" from [employer]. Call 7-951.729.7794 or search [employer] online. · You need 9161-1441 mg of calcium and 5921-7199 IU of vitamin D per day. It is possible to meet your calcium requirement with diet alone, but a vitamin D supplement is usually necessary to meet this goal.  · When exposed to the sun, use a sunscreen that protects against both UVA and UVB radiation with an SPF of 30 or greater. Reapply every 2 to 3 hours or after sweating, drying off with a towel, or swimming. · Always wear a seat belt when traveling in a car. · Get Jayme Segundo COVID-19 booster vaccine      Patient Education        Walking for Exercise: Care Instructions  Your Care Instructions     Walking is one of the easiest ways to get the exercise you need for good health. A brisk, 30-minute walk each day can help you feel better and have more energy. It can help you lower your risk of disease. Walking can help you keep your bones strong and your heart healthy. Check with your doctor before you start a walking plan if you have heart problems, other health issues, or you have not been active in a long time. Follow your doctor's instructions for safe levels of exercise. Follow-up care is a key part of your treatment and safety. Be sure to make and go to all appointments, and call your doctor if you are having problems. It's also a good idea to know your test results and keep a list of the medicines you take. How can you care for yourself at home? Getting started  Start slowly and set a short-term goal. For example, walk for 5 or 10 minutes every day. Bit by bit, increase the amount you walk every day. Try for at least 30 minutes on most days of the week.  You also may want to swim, bike, or do other activities. If finding enough time is a problem, it's fine to be active in shorter periods of time throughout your day. To get the heart-healthy benefits of walking, you need to walk briskly enough to increase your heart rate and breathing, but not so fast that you can't talk comfortably. Wear comfortable shoes that fit well and provide good support for your feet and ankles. Staying with your plan  After you've made walking a habit, set a longer-term goal. You may want to set a goal of walking briskly for longer or walking farther. Experts say to do 2½ hours (150 minutes) of moderate activity a week. A faster heartbeat is what defines moderate-level activity. To stay motivated, walk with friends, coworkers, or pets. Use a phone guillermina or pedometer to track your steps each day. Set a goal to increase your steps. When you reach that goal, set a higher goal.  If the weather keeps you from walking outside, go for walks at the mall with a friend. Local schools and churches may have indoor gyms where you can walk. Fitting a walk into your workday  Park several blocks away from work, or get off the bus a few stops early. Use the stairs instead of the elevator, at least for a few floors. Suggest holding meetings with colleagues during a walk inside or outside the building. Use the restroom that is the farthest from your desk or workstation. Use your morning and afternoon breaks to take quick 15 minutes walks. Staying safe  Know your surroundings. Walk in a well-lighted, safe place. If it's dark, walk with a partner. Wear light-colored clothing. If you can, buy a vest or jacket that reflects light. Carry a cell phone for emergencies. Drink plenty of water. Take a water bottle with you when you walk. This is very important if it is hot out. Be careful not to slip on wet or icy ground. You can buy \"grippers\" for your shoes to help keep you from slipping.   Pay attention to your walking surface. Use sidewalks and paths. If you have health issues such as asthma, COPD, or heart problems, or if you haven't been active for a long time, check with your doctor before you start a new activity. Where can you learn more? Go to https://young.[employer]. org and sign in to your [employer] Hor[employer] account. Enter R159 in the AdAlta box to learn more about \"Walking for Exercise: Care Instructions. \"     If you do not have an account, please click on the \"Sign Up Now\" link. Current as of: May 12, 2021               Content Version: 13.0  © 1815-1849 [employer]. Care instructions adapted under license by [facility] ([facility]). If you have questions about a medical condition or this instruction, always ask your healthcare professional. [address] any warranty or liability for your use of this information. Patient Education        Preventing Falls: Care Instructions  Your Care Instructions     Getting around your home safely can be a challenge if you have injuries or health problems that make it easy for you to fall. Loose rugs and furniture in walkways are among the dangers for many older people who have problems walking or who have poor eyesight. People who have conditions such as arthritis, osteoporosis, or dementia also have to be careful not to fall. You can make your home safer with a few simple measures. Follow-up care is a key part of your treatment and safety. Be sure to make and go to all appointments, and call your doctor if you are having problems. It's also a good idea to know your test results and keep a list of the medicines you take. How can you care for yourself at home? Taking care of yourself  You may get dizzy if you do not drink enough water. To prevent dehydration, drink plenty of fluids. Choose water and other clear liquids.  If you have kidney, heart, or liver disease and have to limit fluids, talk with your doctor before you increase the amount of fluids you drink. Exercise regularly to improve your strength, muscle tone, and balance. Walk if you can. Swimming may be a good choice if you cannot walk easily. Have your vision and hearing checked each year or any time you notice a change. If you have trouble seeing and hearing, you might not be able to avoid objects and could lose your balance. Know the side effects of the medicines you take. Ask your doctor or pharmacist whether the medicines you take can affect your balance. Sleeping pills or sedatives can affect your balance. Limit the amount of alcohol you drink. Alcohol can impair your balance and other senses. Ask your doctor whether calluses or corns on your feet need to be removed. If you wear loose-fitting shoes because of calluses or corns, you can lose your balance and fall. Talk to your doctor if you have numbness in your feet. Preventing falls at home  Remove raised doorway thresholds, throw rugs, and clutter. Repair loose carpet or raised areas in the floor. Move furniture and electrical cords to keep them out of walking paths. Use nonskid floor wax, and wipe up spills right away, especially on ceramic tile floors. If you use a walker or cane, put rubber tips on it. If you use crutches, clean the bottoms of them regularly with an abrasive pad, such as steel wool. Keep your house well lit, especially stairways, porches, and outside walkways. Use night-lights in areas such as hallways and bathrooms. Add extra light switches or use remote switches (such as switches that go on or off when you clap your hands) to make it easier to turn lights on if you have to get up during the night. Install sturdy handrails on stairways. Move items in your cabinets so that the things you use a lot are on the lower shelves (about waist level). Keep a cordless phone and a flashlight with new batteries by your bed.  If possible, put a phone in each of the main rooms of your house, or carry a cell phone in case you fall and cannot reach a phone. Or, you can wear a device around your neck or wrist. You push a button that sends a signal for help. Wear low-heeled shoes that fit well and give your feet good support. Use footwear with nonskid soles. Check the heels and soles of your shoes for wear. Repair or replace worn heels or soles. Do not wear socks without shoes on wood floors. Walk on the grass when the sidewalks are slippery. If you live in an area that gets snow and ice in the winter, sprinkle salt on slippery steps and sidewalks. Preventing falls in the bath  Install grab bars and nonskid mats inside and outside your shower or tub and near the toilet and sinks. Use shower chairs and bath benches. Use a hand-held shower head that will allow you to sit while showering. Get into a tub or shower by putting the weaker leg in first. Get out of a tub or shower with your strong side first.  Repair loose toilet seats and consider installing a raised toilet seat to make getting on and off the toilet easier. Keep your bathroom door unlocked while you are in the shower. Where can you learn more? Go to [email or web address] and sign in to your WinView account. Enter [phone] in the [facility] box to learn more about \"Preventing Falls: Care Instructions. \"     If you do not have an account, please click on the \"Sign Up Now\" link. Current as of: December 7, 2020               Content Version: 13.0  © 7955-5016 [employer]. Care instructions adapted under license by [facility] ([facility]). If you have questions about a medical condition or this instruction, always ask your healthcare professional. [address]  any warranty or liability for your use of this information. Patient Education        Preventing Outdoor Falls: Care Instructions  Your Care Instructions     Worries about falls don't need to keep you indoors.  Outdoor activities like walking have big benefits for your health. You will need to watch your step and learn a few safety measures. If you are worried about having a fall outdoors, ask your doctor about exercises, classes, or physical therapy that may help. You can learn ways to gain strength, flexibility, and balance. Ask if it might help to use a cane or walker. You can make your time outdoors safer with a few simple measures. Follow-up care is a key part of your treatment and safety. Be sure to make and go to all appointments, and call your doctor if you are having problems. It's also a good idea to know your test results and keep a list of the medicines you take. How can you prevent falls outdoors? Wear shoes with firm soles and low heels. If you have to walk on an icy surface, use grippers that can be worn over your shoes in bad weather. Be extra careful if weather is bad. Walk on the grass when the sidewalks are slick. If you live in a place that gets snow and ice in the winter, sprinkle salt on slippery stairs and sidewalks. Be careful getting on or off buses and trains or getting in and out of cars. If handrails are available, use them. Be careful when you cross the street. Look for crosswalks or places where curb cuts or ramps are present. Try not to hurry, especially if you are carrying something. Be cautious in parking lots or garages. There may be curbs or changes in pavement, or the height of the pavement may vary. Make sure to wear the correct eyeglasses, if you need them. Reading glasses or bifocals can make it harder to see hazards that might be in your way. If you are walking outdoors for exercise, try to: Walk in well-lighted, well-maintained areas. These include high school or college tracks, shopping malls, and public spaces. Walk with a partner. Watch out for cracked sidewalks, curbs, changes in the height of the pavement, exposed tree roots, and debris such as fallen leaves or branches.   Where can you learn more? Go to [email or web address]://[email or web address] and sign in to your [employer] account. Enter EULALIO in the [facility] box to learn more about \"Preventing Outdoor Falls: Care Instructions. \"     If you do not have an account, please click on the \"Sign Up Now\" link. Current as of: December 7, 2020               Content Version: 13.0  © 2006-2021 [employer]. Care instructions adapted under license by [facility] ([facility]). If you have questions about a medical condition or this instruction, always ask your healthcare professional. [place] [address] any warranty or liability for your use of this information. Patient Education        Recombinant Zoster (Shingles) Vaccine: What You Need to Know  Why get vaccinated? Recombinant zoster (shingles) vaccine can prevent shingles. Shingles (also called herpes zoster, or just zoster) is a painful skin rash, usually with blisters. In addition to the rash, shingles can cause fever, headache, chills, or upset stomach. More rarely, shingles can lead to pneumonia, hearing problems, blindness, brain inflammation (encephalitis), or death. The most common complication of shingles is long-term nerve pain called postherpetic neuralgia (PHN). PHN occurs in the areas where the shingles rash was, even after the rash clears up. It can last for months or years after the rash goes away. The pain from PHN can be severe and debilitating. About 10 to 18% of people who get shingles will experience PHN. The risk of PHN increases with age. An older adult with shingles is more likely to develop PHN and have longer lasting and more severe pain than a younger person with shingles. Shingles is caused by the varicella zoster virus, the same virus that causes chickenpox. After you have chickenpox, the virus stays in your body and can cause shingles later in life.  Shingles cannot be passed from one person to another, but the virus that causes shingles can spread and cause chickenpox in someone who had never had chickenpox or received chickenpox vaccine. Recombinant shingles vaccine  Recombinant shingles vaccine provides strong protection against shingles. By preventing shingles, recombinant shingles vaccine also protects against PHN. Recombinant shingles vaccine is the preferred vaccine for the prevention of shingles. However, a different vaccine, live shingles vaccine, may be used in some circumstances. The recombinant shingles vaccine is recommended for adults 50 years and older without serious immune problems. It is given as a two-dose series. This vaccine is also recommended for people who have already gotten another type of shingles vaccine, the live shingles vaccine. There is no live virus in this vaccine. Shingles vaccine may be given at the same time as other vaccines. Talk with your health care provider  Tell your vaccine provider if the person getting the vaccine:  Has had an allergic reaction after a previous dose of recombinant shingles vaccine, or has any severe, life-threatening allergies. Is pregnant or breastfeeding. Is currently experiencing an episode of shingles. In some cases, your health care provider may decide to postpone shingles vaccination to a future visit. People with minor illnesses, such as a cold, may be vaccinated. People who are moderately or severely ill should usually wait until they recover before getting recombinant shingles vaccine. Your health care provider can give you more information. Risks of a vaccine reaction  A sore arm with mild or moderate pain is very common after recombinant shingles vaccine, affecting about 80% of vaccinated people. Redness and swelling can also happen at the site of the injection. Tiredness, muscle pain, headache, shivering, fever, stomach pain, and nausea happen after vaccination in more than half of people who receive recombinant shingles vaccine.   In clinical trials, about 1 out of 6 people who got recombinant zoster vaccine experienced side effects that prevented them from doing regular activities. Symptoms usually went away on their own in 2 to 3 days. You should still get the second dose of recombinant zoster vaccine even if you had one of these reactions after the first dose. People sometimes faint after medical procedures, including vaccination. Tell your provider if you feel dizzy or have vision changes or ringing in the ears. As with any medicine, there is a very remote chance of a vaccine causing a severe allergic reaction, other serious injury, or death. What if there is a serious problem? An allergic reaction could occur after the vaccinated person leaves the clinic. If you see signs of a severe allergic reaction (hives, swelling of the face and throat, difficulty breathing, a fast heartbeat, dizziness, or weakness), call 9-1-1 and get the person to the nearest hospital.  For other signs that concern you, call your health care provider. Adverse reactions should be reported to the Vaccine Adverse Event Reporting System (VAERS). Your health care provider will usually file this report, or you can do it yourself. Visit the VAERS website at www.vaers. [facility].gov or call 7-794.419.3804. VAERS is only for reporting reactions, and VAERS staff do not give medical advice. How can I learn more? Ask your health care provider. Call your local or state health department. Contact the Centers for Disease Control and Prevention (CDC): Call 8-617.412.7999 (1-800-CDC-INFO) or  Visit CDC's website at www.cdc.gov/vaccines  Vaccine Information Statement  Recombinant Zoster Vaccine  10/30/2019  [facility] and [facility] for Disease Control and Prevention  Many Vaccine Information Statements are available in Greek and other languages. See www.immunize.org/vis. Hojas de Información Sobre Vacunas están disponibles en Español y en muchos otros idiomas.  Visite Kip.si   Care instructions adapted under license by [facility] ([place] [place] [facility]). If you have questions about a medical condition or this instruction, always ask your healthcare professional. [place] any warranty or liability for your use of this information. Patient Education        Tdap (Tetanus, Diphtheria, Pertussis) Vaccine: What You Need to Know  Why get vaccinated? Tdap vaccine can prevent tetanus, diphtheria, and pertussis. Diphtheria and pertussis spread from person to person. Tetanus enters the body through cuts or wounds. TETANUS (T) causes painful stiffening of the muscles. Tetanus can lead to serious health problems, including being unable to open the mouth, having trouble swallowing and breathing, or death. DIPHTHERIA (D) can lead to difficulty breathing, heart failure, paralysis, or death. PERTUSSIS (aP), also known as \"whooping cough,\" can cause uncontrollable, violent coughing which makes it hard to breathe, eat, or drink. Pertussis can be extremely serious in babies and young children, causing pneumonia, convulsions, brain damage, or death. In teens and adults, it can cause weight loss, loss of bladder control, passing out, and rib fractures from severe coughing. Tdap vaccine  Tdap is only for children 7 years and older, adolescents, and adults. Adolescents should receive a single dose of Tdap, preferably at age 6 or 15 years. Pregnant women should get a dose of Tdap during every pregnancy, to protect the  from pertussis. Infants are most at risk for severe, life threatening complications from pertussis. Adults who have never received Tdap should get a dose of Tdap. Also, adults should receive a booster dose every 10 years, or earlier in the case of a severe and dirty wound or burn. Booster doses can be either Tdap or Td (a different vaccine that protects against tetanus and diphtheria but not pertussis).   Tdap may be given at the same time as other vaccines. Talk with your health care provider  Tell your vaccine provider if the person getting the vaccine:  Has had an allergic reaction after a previous dose of any vaccine that protects against tetanus, diphtheria, or pertussis, or has any severe, life threatening allergies. Has had a coma, decreased level of consciousness, or prolonged seizures within 7 days after a previous dose of any pertussis vaccine (DTP, DTaP, or Tdap). Has seizures or another nervous system problem. Has ever had Guillain-Barré Syndrome (also called GBS). Has had severe pain or swelling after a previous dose of any vaccine that protects against tetanus or diphtheria. In some cases, your health care provider may decide to postpone Tdap vaccination to a future visit. People with minor illnesses, such as a cold, may be vaccinated. People who are moderately or severely ill should usually wait until they recover before getting Tdap vaccine. Your health care provider can give you more information. Risks of a vaccine reaction  Pain, redness, or swelling where the shot was given, mild fever, headache, feeling tired, and nausea, vomiting, diarrhea, or stomachache sometimes happen after Tdap vaccine. People sometimes faint after medical procedures, including vaccination. Tell your provider if you feel dizzy or have vision changes or ringing in the ears. As with any medicine, there is a very remote chance of a vaccine causing a severe allergic reaction, other serious injury, or death. What if there is a serious problem? An allergic reaction could occur after the vaccinated person leaves the clinic. If you see signs of a severe allergic reaction (hives, swelling of the face and throat, difficulty breathing, a fast heartbeat, dizziness, or weakness), call 9-1-1 and get the person to the nearest hospital.  For other signs that concern you, call your health care provider.   Adverse reactions should be reported to the Vaccine Adverse Event Reporting System (VAERS). Your health care provider will usually file this report, or you can do it yourself. Visit the VAERS website at www.vaers. hhs.gov or call 8-167.767.1853. VAERS is only for reporting reactions, and VAERS staff do not give medical advice. The National Vaccine Injury Compensation Program  The National Vaccine Injury Compensation Program (VICP) is a federal program that was created to compensate people who may have been injured by certain vaccines. Visit the VICP website at www.hrsa.gov/vaccinecompensation or call 7-225.403.4857 to learn about the program and about filing a claim. There is a time limit to file a claim for compensation. How can I learn more? Ask your health care provider. Call your local or state health department. Contact the Centers for Disease Control and Prevention (CDC): Call 8-440.681.5500 (1-800-CDC-INFO) or  Visit CDC's website at www.cdc.gov/vaccines  Vaccine Information Statement (Interim)  Tdap (Tetanus, Diphtheria, Pertussis) Vaccine  04/01/2020  44 . Yandel Franz [id number]  Department of Health and Human Services  Centers for Disease Control and Prevention  Many Vaccine Information Statements are available in Sinhala and other languages. See www.immunize.org/vis. Muchas hojas de información sobre vacunas están disponibles en español y en otros idiomas. Visite www.immunize.org/vis. Care instructions adapted under license by [facility] ([facility]). If you have questions about a medical condition or this instruction, always ask your healthcare professional. [place] [address] any warranty or liability for your use of this information. Patient Education        Well Visit, Over 72: Care Instructions  Overview     Well visits can help you stay healthy. Your doctor has checked your overall health and may have suggested ways to take good care of yourself. Your doctor also may have recommended tests.  At home, you can help prevent illness with healthy eating, regular exercise, and other steps. Follow-up care is a key part of your treatment and safety. Be sure to make and go to all appointments, and call your doctor if you are having problems. It's also a good idea to know your test results and keep a list of the medicines you take. How can you care for yourself at home? Get screening tests that you and your doctor decide on. Screening helps find diseases before any symptoms appear. Eat healthy foods. Choose fruits, vegetables, whole grains, protein, and low-fat dairy foods. Limit fat, especially saturated fat. Reduce salt in your diet. Limit alcohol. If you are a man, have no more than 2 drinks a day or 14 drinks a week. If you are a woman, have no more than 1 drink a day or 7 drinks a week. Since alcohol affects older adults differently, you may want to limit alcohol even more. Or you may not want to drink at all. Get at least 30 minutes of exercise on most days of the week. Walking is a good choice. You also may want to do other activities, such as running, swimming, cycling, or playing tennis or team sports. Reach and stay at a healthy weight. This will lower your risk for many problems, such as obesity, diabetes, heart disease, and high blood pressure. Do not smoke. Smoking can make health problems worse. If you need help quitting, talk to your doctor about stop-smoking programs and medicines. These can increase your chances of quitting for good. Care for your mental health. It is easy to get weighed down by worry and stress. Learn strategies to manage stress, like deep breathing and mindfulness, and stay connected with your family and community. If you find you often feel sad or hopeless, talk with your doctor. Treatment can help. Talk to your doctor about whether you have any risk factors for sexually transmitted infections (STIs).  You can help prevent STIs if you wait to have sex with a new partner (or partners) until you've each been tested for STIs. It also helps if you use condoms (male or female condoms) and if you limit your sex partners to one person who only has sex with you. Vaccines are available for some STIs. If you think you may have a problem with alcohol or drug use, talk to your doctor. This includes prescription medicines (such as amphetamines and opioids) and illegal drugs (such as cocaine and methamphetamine). Your doctor can help you figure out what type of treatment is best for you. Protect your skin from too much sun. When you're outdoors from 10 a.m. to 4 p.m., stay in the shade or cover up with clothing and a hat with a wide brim. Wear sunglasses that block UV rays. Even when it's cloudy, put broad-spectrum sunscreen (SPF 30 or higher) on any exposed skin. See a dentist one or two times a year for checkups and to have your teeth cleaned. Wear a seat belt in the car. When should you call for help? Watch closely for changes in your health, and be sure to contact your doctor if you have any problems or symptoms that concern you. Where can you learn more? Go to [email or web address] and sign in to your [email or web address].net account. Enter C997 in the [employer] box to learn more about \"Well Visit, Over 65: Care Instructions. \"     If you do not have an account, please click on the \"Sign Up Now\" link. Current as of: February 11, 2021               Content Version: 13.0  © 2006-2021 [employer]. Care instructions adapted under license by [facility] ([facility]). If you have questions about a medical condition or this instruction, always ask your healthcare professional. [place] [address] any warranty or liability for your use of this information.

## 2021-10-26 LAB
CREATININE URINE: 96.3 MG/DL (ref 39–259)
MICROALBUMIN UR-MCNC: 2.3 MG/DL
MICROALBUMIN/CREAT UR-RTO: 23.9 MG/G (ref 0–30)

## 2021-10-27 RX ORDER — SIMVASTATIN 20 MG
TABLET ORAL
Qty: 90 TABLET | Refills: 2 | Status: SHIPPED | OUTPATIENT
Start: 2021-10-27 | End: 2021-12-09 | Stop reason: SDUPTHER

## 2021-12-06 ENCOUNTER — TELEPHONE (OUTPATIENT)
Dept: INTERNAL MEDICINE CLINIC | Age: 71
End: 2021-12-06

## 2021-12-06 NOTE — TELEPHONE ENCOUNTER
New mail order is [place] but he does not need refills at this time.   Patient will call when he needs refills of his medication

## 2021-12-06 NOTE — TELEPHONE ENCOUNTER
Pt called requesting info on the mail order pharm Dr. Dax Hernandez recommended. I did not see any mention of this in his last appt note.

## 2021-12-06 NOTE — TELEPHONE ENCOUNTER
Last appointment: 10/25/2021  Next appointment: 5/9/2022  Last refill: Patient will call when he needs refills.

## 2021-12-09 DIAGNOSIS — N40.1 BENIGN PROSTATIC HYPERPLASIA WITH WEAK URINARY STREAM: ICD-10-CM

## 2021-12-09 DIAGNOSIS — R39.12 BENIGN PROSTATIC HYPERPLASIA WITH WEAK URINARY STREAM: ICD-10-CM

## 2021-12-09 RX ORDER — SIMVASTATIN 20 MG
TABLET ORAL
Qty: 90 TABLET | Refills: 1 | Status: SHIPPED | OUTPATIENT
Start: 2021-12-09 | End: 2022-05-09 | Stop reason: SDUPTHER

## 2021-12-09 RX ORDER — SITAGLIPTIN AND METFORMIN HYDROCHLORIDE 500; 50 MG/1; MG/1
TABLET, FILM COATED ORAL
Qty: 180 TABLET | Refills: 1 | Status: SHIPPED | OUTPATIENT
Start: 2021-12-09 | End: 2022-01-03

## 2021-12-09 RX ORDER — LISINOPRIL 30 MG/1
TABLET ORAL
Qty: 90 TABLET | Refills: 1 | Status: SHIPPED | OUTPATIENT
Start: 2021-12-09 | End: 2022-05-09 | Stop reason: SDUPTHER

## 2021-12-09 RX ORDER — TAMSULOSIN HYDROCHLORIDE 0.4 MG/1
CAPSULE ORAL
Qty: 90 CAPSULE | Refills: 1 | Status: SHIPPED | OUTPATIENT
Start: 2021-12-09 | End: 2022-05-09 | Stop reason: SDUPTHER

## 2021-12-09 NOTE — TELEPHONE ENCOUNTER
Spoke to patient and confirmed which medications. Medications should now go through [place].  Medication pended    Last appointment: 10/25/2021  Next appointment: 5/9/2022

## 2021-12-09 NOTE — TELEPHONE ENCOUNTER
Patient is requesting to speak with Nicolette Spears regarding his prescription refills through [employer]. He states he needs her \"to call his insurance and provide a list of all his medications for a 90 day supply with refills, for the new year. \" He states he can't wait until he needs a refill, because he will be out of his medications. Please contact patient at phone # provided.

## 2022-01-03 RX ORDER — SITAGLIPTIN AND METFORMIN HYDROCHLORIDE 500; 50 MG/1; MG/1
TABLET, FILM COATED ORAL
Qty: 180 TABLET | Refills: 1 | Status: SHIPPED | OUTPATIENT
Start: 2022-01-03 | End: 2022-05-09 | Stop reason: SDUPTHER

## 2022-02-22 ENCOUNTER — OFFICE VISIT (OUTPATIENT)
Dept: INTERNAL MEDICINE CLINIC | Age: 72
End: 2022-02-22
Payer: MEDICARE

## 2022-02-22 VITALS
BODY MASS INDEX: 35.12 KG/M2 | OXYGEN SATURATION: 96 % | RESPIRATION RATE: 14 BRPM | DIASTOLIC BLOOD PRESSURE: 76 MMHG | WEIGHT: 265 LBS | SYSTOLIC BLOOD PRESSURE: 138 MMHG | HEART RATE: 94 BPM | HEIGHT: 73 IN

## 2022-02-22 DIAGNOSIS — J32.0 CHRONIC MAXILLARY SINUSITIS: ICD-10-CM

## 2022-02-22 DIAGNOSIS — J30.9 ALLERGIC RHINITIS, UNSPECIFIED SEASONALITY, UNSPECIFIED TRIGGER: ICD-10-CM

## 2022-02-22 DIAGNOSIS — E11.40 CONTROLLED TYPE 2 DIABETES WITH NEUROPATHY (HCC): ICD-10-CM

## 2022-02-22 DIAGNOSIS — K13.70 MOUTH PROBLEM: Primary | ICD-10-CM

## 2022-02-22 DIAGNOSIS — B35.3 TINEA PEDIS OF BOTH FEET: ICD-10-CM

## 2022-02-22 DIAGNOSIS — Z23 NEED FOR SHINGLES VACCINE: ICD-10-CM

## 2022-02-22 DIAGNOSIS — L40.9 PSORIASIS: ICD-10-CM

## 2022-02-22 PROCEDURE — 99214 OFFICE O/P EST MOD 30 MIN: CPT | Performed by: INTERNAL MEDICINE

## 2022-02-22 RX ORDER — ZOSTER VACCINE RECOMBINANT, ADJUVANTED 50 MCG/0.5
0.5 KIT INTRAMUSCULAR ONCE
Qty: 0.5 ML | Refills: 0 | Status: SHIPPED | OUTPATIENT
Start: 2022-02-22 | End: 2022-02-22

## 2022-02-22 RX ORDER — FLUTICASONE PROPIONATE 50 MCG
2 SPRAY, SUSPENSION (ML) NASAL DAILY
Qty: 16 G | Refills: 5 | Status: SHIPPED | OUTPATIENT
Start: 2022-02-22 | End: 2022-05-09 | Stop reason: ALTCHOICE

## 2022-02-22 RX ORDER — AZITHROMYCIN 250 MG/1
250 TABLET, FILM COATED ORAL SEE ADMIN INSTRUCTIONS
Qty: 6 TABLET | Refills: 0 | Status: SHIPPED | OUTPATIENT
Start: 2022-02-22 | End: 2022-02-27

## 2022-02-22 RX ORDER — BETAMETHASONE DIPROPIONATE 0.5 MG/G
CREAM TOPICAL
Qty: 80 G | Refills: 0 | Status: SHIPPED | OUTPATIENT
Start: 2022-02-22 | End: 2022-07-15 | Stop reason: SDUPTHER

## 2022-02-22 RX ORDER — PRENATAL VIT 91/IRON/FOLIC/DHA 28-975-200
COMBINATION PACKAGE (EA) ORAL
COMMUNITY
Start: 2022-02-22

## 2022-02-22 NOTE — PATIENT INSTRUCTIONS
Patient Education        Allergies: Care Instructions  Overview     Allergies occur when your body's defense system (immune system) overreacts to certain substances. The immune system treats a harmless substance as if it were a harmful germ or virus. Many things can make this happen. These include pollens, medicine, food, dust, animal dander, and mold. Allergies can be mild or severe. Mild allergies can be managed with home treatment. But medicine may be needed to prevent problems. Managing your allergies is an important part of staying healthy. Your doctor may suggest that you have allergy testing to help find out what is causing your allergies. Severe allergies can cause reactions that affect your whole body (anaphylactic reactions). Your doctor may prescribe a shot of epinephrine to carry with you in case you have a severe reaction. Learn how to give yourself the shot and keep it with you at all times. Make sure it is not . Follow-up care is a key part of your treatment and safety. Be sure to make and go to all appointments, and call your doctor if you are having problems. It's also a good idea to know your test results and keep a list of the medicines you take. How can you care for yourself at home? · If you have been told by your doctor that dust or dust mites are causing your allergy, decrease the dust around your bed:  ? Wash sheets, pillowcases, and other bedding in hot water every week. ? Use dust-proof covers for pillows, duvets, and mattresses. Avoid plastic covers because they tear easily and do not \"breathe. \" Wash as instructed on the label. ? Do not use any blankets and pillows that you do not need. ? Use blankets that you can wash in your washing machine. ? Consider removing drapes and carpets, which attract and hold dust, from your bedroom. · If you are allergic to house dust and mites, do not use home humidifiers.  Your doctor can suggest ways you can control dust and mites.  · Look for signs of cockroaches. Cockroaches cause allergic reactions. Use cockroach baits to get rid of them. Then, clean your home well. Cockroaches like areas where grocery bags, newspapers, empty bottles, or cardboard boxes are stored. Do not keep these inside your home, and keep trash and food containers sealed. Seal off any spots where cockroaches might enter your home. · If you are allergic to mold, get rid of furniture, rugs, and drapes that smell musty. Check for mold in the bathroom. · If you are allergic to outdoor pollen or mold spores, use air-conditioning. Change or clean all filters every month. Keep windows closed. · If you are allergic to pollen, stay inside when pollen counts are high. Use a vacuum  with a HEPA filter or a double-thickness filter at least two times each week. · Stay inside when air pollution is bad. Avoid paint fumes, perfumes, and other strong odors. · Avoid conditions that make your allergies worse. Stay away from smoke. Do not smoke or let anyone else smoke in your house. Do not use fireplaces or wood-burning stoves. · If you are allergic to your pets, change the air filter in your furnace every month. Use high-efficiency filters. · If you are allergic to pet dander, keep pets outside or out of your bedroom. Old carpet and cloth furniture can hold a lot of animal dander. You may need to replace them. When should you call for help? Give an epinephrine shot if:    · You think you are having a severe allergic reaction.     · You have symptoms in more than one body area, such as mild nausea and an itchy mouth. After giving an epinephrine shot call 911, even if you feel better. Call 911 if:    · You have symptoms of a severe allergic reaction. These may include:  ? Sudden raised, red areas (hives) all over your body. ? Swelling of the throat, mouth, lips, or tongue. ? Trouble breathing. ? Passing out (losing consciousness).  Or you may feel very lightheaded or suddenly feel weak, confused, or restless.     · You have been given an epinephrine shot, even if you feel better. Call your doctor now or seek immediate medical care if:    · You have symptoms of an allergic reaction, such as:  ? A rash or hives (raised, red areas on the skin). ? Itching. ? Swelling. ? Belly pain, nausea, or vomiting. Watch closely for changes in your health, and be sure to contact your doctor if:    · You do not get better as expected. Where can you learn more? Go to https://"2,10E+07"peAssistera.[employer]. org and sign in to your [employer] account. Enter R317 in the Tapp[employer] box to learn more about \"Allergies: Care Instructions. \"     If you do not have an account, please click on the \"Sign Up Now\" link. Current as of: February 10, 2021               Content Version: 13.1  © 2531-1679 Healthwise, [employer]. Care instructions adapted under license by [facility] ([facility]). If you have questions about a medical condition or this instruction, always ask your healthcare professional. [place] [address] any warranty or liability for your use of this information.

## 2022-02-22 NOTE — PROGRESS NOTES
[facility]) Physicians  Internal Medicine  Patient Encounter  [address] [facility], D.O., Leslye finnegan        Chief Complaint   Patient presents with    Mouth Lesions     top of mouth, x2weeks       HPI: 73 y.o. male with history of diabetes seen today complaining of a sensation of something on the roof of his mouth. He noticed this about 2 weeks ago. The patient denies seeing any lesions or wounds along the palate. He did notice raised structures on the back of his tongue. He denies any pain, itching. He denies any blistering. He denies any sore throat or odynophagia. He has had increased nasal congestion and drainage. He has a history of sinus infections. At times the nasal discharge can be green. He denies any fever, chills, sweats. Patient frequently irrigates his nasal passages with salt water. He denies any sinus pain or pressure. Patient is also complaining of a skin rash on his feet. The patient has used ketoconazole in the past given to him by dermatology. He does not think this works. He does have mycotic toenails but wants a topical cream for his feet. The patient is also requesting a refill on betamethasone cream for his psoriatic plaques. Patient is requesting samples of Janumet    Past Medical History:   Diagnosis Date    BPH     Cholelithiasis     H. pylori infection 10/2017    Dr. Partha Oshea Herpes     Hyperlipidemia     Hypertension     Obesity (BMI 30-39. 9) 8/18/2017    Primary osteoarthritis of right hip 7/17/2019    Psoriasis     Type II or unspecified type diabetes mellitus without mention of complication, not stated as uncontrolled          MEDICATIONS:  Medication Sig   JANUMET  MG per tablet TAKE ONE TABLET BY MOUTH TWICE A DAY WITH MEALS   lisinopril (PRINIVIL;ZESTRIL) 30 MG tablet TAKE ONE TABLET BY MOUTH DAILY   simvastatin (ZOCOR) 20 MG tablet TAKE ONE TABLET BY MOUTH EVERY MORNING   tamsulosin (FLOMAX) 0.4 MG capsule TAKE ONE CAPSULE BY MOUTH EVERY EVENING blood glucose test strips (FREESTYLE LITE) strip TEST TWICE daily   Blood Glucose Monitoring Suppl (ACURA BLOOD GLUCOSE METER) w/Device KIT 1 Device by Does not apply route daily Please dispense insurance/pt preference. E11.9    Check blood sugars QD   senna-docusate (PERICOLACE) 8.6-50 MG per tablet Take 2 tablets by mouth 2 times daily   Cholecalciferol (VITAMIN D3) 1000 UNITS TABS Take 1 tablet by mouth daily. aspirin EC 81 MG EC tablet Take 1 tablet by mouth daily. lisinopril (PRINIVIL;ZESTRIL) 30 MG tablet Take 1 tablet by mouth daily           Review of Systems - As per HPI      OBJECTIVE:  /76   Pulse 94   Resp 14   Ht 6' 1\" (1.854 m)   Wt 265 lb (120.2 kg)   SpO2 96%   BMI 34.96 kg/m²   GEN: NAD, A&O, Non-toxic  HEENT: NC/AT, CHAD, EOMI. oral cavity is normal.  No hard or soft palate lesions. Papillary taste buds are visible. No oral lesions otherwise.  + Postnasal drainage noted that has a purulent appearance. Nasal cavity with mucosal congestion, scalloped pale pink appearance. SKIN: Elbows and knees with hyperemic plaques though not scaly or silvery. Both feet with flaky skin along the medial and lateral portions of the feet. Mycotic toenails noted. No cracking or fissures    ASSESSMENT[de-identified]  Sincere Goyal was seen today for mouth lesions. Diagnoses and all orders for this visit:    Mouth problem  --Examination reveals no abnormalities  --Continue to observe    Controlled type 2 diabetes with neuropathy ([facility] [address]  --Condition control is uncertain. --Due for lab    Psoriasis  -     betamethasone dipropionate 0.05 % cream; Apply topically 2 times daily for 14 days as needed for psoriasis plaques. Do not use for more than 14 consecutive days. Tinea pedis of both feet  -     terbinafine (LAMISIL AT) 1 % cream; Apply topically 2 times daily for 2-4 weeks. Need for shingles vaccine  -     zoster recombinant adjuvanted vaccine [facility]) 50 MCG/0.5ML SUSR injection;  Inject 0.5 mLs into the muscle once for 1 dose    Allergic rhinitis, unspecified seasonality, unspecified trigger  -     fluticasone (FLONASE) 50 MCG/ACT nasal spray; 2 sprays by Each Nostril route daily    Chronic maxillary sinusitis  -     azithromycin (ZITHROMAX) 250 MG tablet; Take 1 tablet by mouth See Admin Instructions for 5 days 500mg on day 1 followed by 250mg on days 2 - 5        Additional Plan:  1. Advised patient to return should symptoms persist, worsen or if new symptoms develop. He will be seen in May for checkup and lab    Discussed medications with patient who voiced understanding of their use, indication and potential side effects. Pt also understands the above recommendations. All questions answered. This note was generated completely or in part utilizing Dragon dictation speech recognition software. Occasionally, words are mistranscribed and despite editing, the text may contain inaccuracies due to incorrect word recognition.   If further clarification is needed please contact the office at [phone]       Electronically signed    ALMITA GascaOFredrick

## 2022-05-03 LAB
A/G RATIO: 1.2 (CALC) (ref 1–2.5)
ALBUMIN SERPL-MCNC: 1.2 G/DL
ALBUMIN SERPL-MCNC: 4.3 G/DL (ref 3.6–5.1)
ALP BLD-CCNC: 55 U/L
ALP BLD-CCNC: 55 U/L (ref 35–144)
ALT SERPL-CCNC: 35 U/L
ALT SERPL-CCNC: 35 U/L (ref 9–46)
ANION GAP SERPL CALCULATED.3IONS-SCNC: NORMAL MMOL/L
AST SERPL-CCNC: 23 U/L
AST SERPL-CCNC: 23 U/L (ref 10–35)
AVERAGE GLUCOSE: NORMAL
BILIRUB SERPL-MCNC: 0.7 MG/DL (ref 0.1–1.4)
BILIRUB SERPL-MCNC: 0.7 MG/DL (ref 0.2–1.2)
BUN / CREAT RATIO: ABNORMAL (CALC) (ref 6–22)
BUN BLDV-MCNC: 14 MG/DL (ref 7–25)
BUN BLDV-MCNC: NORMAL MG/DL
CALCIUM SERPL-MCNC: 9.9 MG/DL
CALCIUM SERPL-MCNC: 9.9 MG/DL (ref 8.6–10.3)
CHLORIDE BLD-SCNC: 101 MMOL/L
CHLORIDE BLD-SCNC: 101 MMOL/L (ref 98–110)
CHOLESTEROL, TOTAL: 172 MG/DL
CHOLESTEROL, TOTAL: 172 MG/DL
CHOLESTEROL/HDL RATIO: 2.6
CHOLESTEROL/HDL RATIO: 2.6 (CALC)
CO2: 27 MMOL/L
CO2: 27 MMOL/L (ref 20–32)
CREAT SERPL-MCNC: 0.88 MG/DL (ref 0.7–1.25)
CREAT SERPL-MCNC: NORMAL MG/DL
GFR AFRICAN AMERICAN: 102 ML/MIN/1.73M2
GFR CALCULATED: NORMAL
GFR NON-AFRICAN AMERICAN: 88 ML/MIN/1.73M2
GLOBULIN: 3.7 G/DL (CALC) (ref 1.9–3.7)
GLUCOSE BLD-MCNC: 127 MG/DL
GLUCOSE BLD-MCNC: 127 MG/DL (ref 65–99)
HBA1C MFR BLD: 6.7 %
HBA1C MFR BLD: 6.7 % OF TOTAL HGB
HDLC SERPL-MCNC: 65 MG/DL
HDLC SERPL-MCNC: 65 MG/DL (ref 35–70)
LDL CHOLESTEROL CALCULATED: 83 MG/DL (CALC)
LDL CHOLESTEROL CALCULATED: 83 MG/DL (ref 0–160)
NONHDLC SERPL-MCNC: 107 MG/DL
NONHDLC SERPL-MCNC: 107 MG/DL (CALC)
POTASSIUM SERPL-SCNC: 4.5 MMOL/L
POTASSIUM SERPL-SCNC: 4.5 MMOL/L (ref 3.5–5.3)
SODIUM BLD-SCNC: 140 MMOL/L
SODIUM BLD-SCNC: 140 MMOL/L (ref 135–146)
TOTAL PROTEIN: 8
TOTAL PROTEIN: 8 G/DL (ref 6.1–8.1)
TRIGL SERPL-MCNC: 140 MG/DL
TRIGL SERPL-MCNC: 140 MG/DL
VLDLC SERPL CALC-MCNC: NORMAL MG/DL

## 2022-05-09 ENCOUNTER — OFFICE VISIT (OUTPATIENT)
Dept: INTERNAL MEDICINE CLINIC | Age: 72
End: 2022-05-09
Payer: MEDICARE

## 2022-05-09 VITALS
HEART RATE: 72 BPM | SYSTOLIC BLOOD PRESSURE: 128 MMHG | HEIGHT: 73 IN | RESPIRATION RATE: 14 BRPM | OXYGEN SATURATION: 97 % | BODY MASS INDEX: 35.39 KG/M2 | WEIGHT: 267 LBS | DIASTOLIC BLOOD PRESSURE: 76 MMHG

## 2022-05-09 DIAGNOSIS — R39.12 BENIGN PROSTATIC HYPERPLASIA WITH WEAK URINARY STREAM: ICD-10-CM

## 2022-05-09 DIAGNOSIS — N40.1 BENIGN PROSTATIC HYPERPLASIA WITH WEAK URINARY STREAM: ICD-10-CM

## 2022-05-09 DIAGNOSIS — Z23 NEED FOR PNEUMOCOCCAL VACCINATION: ICD-10-CM

## 2022-05-09 DIAGNOSIS — Z12.5 SCREENING FOR PROSTATE CANCER: ICD-10-CM

## 2022-05-09 DIAGNOSIS — E11.40 CONTROLLED TYPE 2 DIABETES WITH NEUROPATHY (HCC): Primary | ICD-10-CM

## 2022-05-09 DIAGNOSIS — K80.20 CALCULUS OF GALLBLADDER WITHOUT CHOLECYSTITIS WITHOUT OBSTRUCTION: ICD-10-CM

## 2022-05-09 DIAGNOSIS — E78.5 HYPERLIPIDEMIA, UNSPECIFIED HYPERLIPIDEMIA TYPE: ICD-10-CM

## 2022-05-09 DIAGNOSIS — I10 BENIGN ESSENTIAL HTN: ICD-10-CM

## 2022-05-09 PROCEDURE — 99214 OFFICE O/P EST MOD 30 MIN: CPT | Performed by: INTERNAL MEDICINE

## 2022-05-09 PROCEDURE — 3044F HG A1C LEVEL LT 7.0%: CPT | Performed by: INTERNAL MEDICINE

## 2022-05-09 PROCEDURE — 90677 PCV20 VACCINE IM: CPT | Performed by: INTERNAL MEDICINE

## 2022-05-09 RX ORDER — TAMSULOSIN HYDROCHLORIDE 0.4 MG/1
CAPSULE ORAL
Qty: 90 CAPSULE | Refills: 1 | Status: SHIPPED | OUTPATIENT
Start: 2022-05-09

## 2022-05-09 RX ORDER — SIMVASTATIN 20 MG
TABLET ORAL
Qty: 90 TABLET | Refills: 1 | Status: SHIPPED | OUTPATIENT
Start: 2022-05-09 | End: 2022-05-26

## 2022-05-09 RX ORDER — SITAGLIPTIN AND METFORMIN HYDROCHLORIDE 500; 50 MG/1; MG/1
TABLET, FILM COATED ORAL
Qty: 180 TABLET | Refills: 1 | Status: SHIPPED | OUTPATIENT
Start: 2022-05-09

## 2022-05-09 RX ORDER — LISINOPRIL 30 MG/1
TABLET ORAL
Qty: 90 TABLET | Refills: 1 | Status: SHIPPED | OUTPATIENT
Start: 2022-05-09

## 2022-05-09 NOTE — PATIENT INSTRUCTIONS
To do list:    #1 schedule eye exam    #2 lifestyle modification including low calorie diet focusing on Low fat/low cholesterol and low carbohydrate intake, along with  increasing cardiovascular (aerobic) exercise.

## 2022-05-09 NOTE — PROGRESS NOTES
[facility]) Physicians  Internal Medicine  Patient Encounter  Brittny Espinosa D.O., Lyndia Brittle         Chief Complaint   Patient presents with   Cruz Gallegos    Medication Check       HPI: 73 y.o. male seen today for follow up regarding the status of his current chronic medical problems listed below and med reconciliation. Diabetes Mellitus Type II, Follow-up--   Lab Results   Component Value Date    LABA1C 6.7 05/03/2022     Pt states he feels he is doing well. He does not adhere to a strict diabetic diet. He tries to avoid some sweets. He does not exercise. He does walk daily for 15-20 minutes. Last Eye Exam-- 1/19/2018. No report of eye exam since 2018. He went in January 2020. U. Microalbumin/Cr--12/6/2019, repeat today  Pt is on ACEI-- Lisinopril  Complications-- None  Insulin Treated? No.    + ASA 81 mg daily  + Statin  Last LDL--83    HTN--Patient denies any new headaches, lightheadedness, syncope, chest pain, shortness of breath, orthopnea, swelling. He denies TIA or stroke related symptoms-- specifically unilateral weakness, paresthesias, speech or vision changes. BPH--  He denies any symptoms of slow stream, nocturia, frequency. He denies any hematuria. Hyperlipidemia:  He is tolerating Simvastatin. He denies myalgias, weakness. Lab Results   Component Value Date    LDLCALC 83 05/03/2022     Gallstones-- Previous ultrasound in September 2017 showed cholelithiasis. He continues to deny abd pain or nausea after eating. Stable. Past Medical History:   Diagnosis Date    BPH     Cholelithiasis     H. pylori infection 10/2017    Dr. Sandra Mayen Herpes     Hyperlipidemia     Hypertension     Obesity (BMI 30-39. 9) 8/18/2017    Primary osteoarthritis of right hip 7/17/2019    Psoriasis     Type II or unspecified type diabetes mellitus without mention of complication, not stated as uncontrolled        Medication Sig   lisinopril (PRINIVIL;ZESTRIL) 30 MG tablet Take 1 tablet by mouth daily   tamsulosin (FLOMAX) 0.4 MG capsule TAKE ONE CAPSULE BY MOUTH EVERY EVENING   simvastatin (ZOCOR) 20 MG tablet TAKE ONE TABLET BY MOUTH EVERY MORNING   sitaGLIPtan-metformin (JANUMET)  MG per tablet Take 1 tablet by mouth 2 times daily (with meals)   senna-docusate (PERICOLACE) 8.6-50 MG per tablet Take 2 tablets by mouth 2 times daily   Cholecalciferol (VITAMIN D3) 1000 UNITS TABS Take 1 tablet by mouth daily. aspirin EC 81 MG EC tablet Take 1 tablet by mouth daily. blood glucose test strips (FREESTYLE LITE) strip TEST TWICE daily   Blood Glucose Monitoring Suppl (ACURA BLOOD GLUCOSE METER) w/Device KIT 1 Device by Does not apply route daily Please dispense insurance/pt preference. E11.9    Check blood sugars QD            Review of Systems - As per HPI  NEURO: Numbness right foot      OBJECTIVE:  /76   Pulse 72   Resp 14   Ht 6' 1\" (1.854 m)   Wt 267 lb (121.1 kg)   SpO2 97%   BMI 35.23 kg/m²    Wt Readings from Last 3 Encounters:   05/09/22 267 lb (121.1 kg)   02/22/22 265 lb (120.2 kg)   10/25/21 263 lb (119.3 kg)     Down 7 pounds over the last year    GEN: NAD, A&O, Non-toxic, Obese  HEENT: NC/AT, CHAD, EOMI. Anicteric. Oral cavity clear. TMs normal.  NECK: Supple. No thyromegaly. No JVD  LYMPH: No C/SC nodes. CV: Regular rhythm. Rate normal.  No murmur. No ectopy. VASC: No carotid bruits, Pedal pulses symmetrical  PULM: CTA  GI: Abdomen is soft, nontender, nondistended. No masses. No hepatomegaly  EXT:No edema noted today. NEURO: Diminished monofilament sensation particularly in the right foot versus the left foot. Diminished vibratory sensation in both feet. MS: No synovitis. No joint effusions. SKIN: Feet normal color and temperature, no large calluses, ulcers or wounds. Mycotic toenails           ASSESSMENT/PLAN:    1.  Controlled type 2 diabetes with neuropathy ([address]  Diabetes is well controlled  Due for foot exam  Continue current medication  Refilled Janumet  Due for eye exam in April 2022  Counseled patient on the importance of Lifestyle modification including low calorie diet focusing on Low fat/low cholesterol and low carbohydrate intake, along with  increasing cardiovascular (aerobic) exercise. - sitaGLIPtan-metFORMIN (JANUMET)  MG per tablet; TAKE ONE TABLET BY MOUTH TWICE A DAY WITH MEALS  Dispense: 180 tablet; Refill: 1  -  DIABETES FOOT EXAM  - Pneumococcal Conjugate PCV20, PF (Prevnar 20)    2. Benign prostatic hyperplasia with weak urinary stream  Condition is well controlled  Continue Flomax, refill  Check PSA in 6  - tamsulosin (FLOMAX) 0.4 MG capsule; TAKE ONE CAPSULE BY MOUTH EVERY EVENING  Dispense: 90 capsule; Refill: 1    3. Benign essential HTN  Blood pressure is well controlled  Continue lisinopril  Refill  - lisinopril (PRINIVIL;ZESTRIL) 30 MG tablet; TAKE ONE TABLET BY MOUTH DAILY  Dispense: 90 tablet; Refill: 1    4. Hyperlipidemia, unspecified hyperlipidemia type  Condition is well controlled  Continue simvastatin  Monitor for adverse statin side effect  - simvastatin (ZOCOR) 20 MG tablet; TAKE ONE TABLET BY MOUTH EVERY MORNING  Dispense: 90 tablet; Refill: 1    5. Need for pneumococcal vaccination  Prevnar 20  - Pneumococcal Conjugate PCV20, PF (Prevnar 20)    6.  Calculus of gallbladder without cholecystitis without obstruction  Asymptomatic  Patient educated on signs and symptoms of biliary colic

## 2022-05-25 ENCOUNTER — TELEMEDICINE (OUTPATIENT)
Dept: INTERNAL MEDICINE CLINIC | Age: 72
End: 2022-05-25
Payer: MEDICARE

## 2022-05-25 DIAGNOSIS — Z11.59 ENCOUNTER FOR SCREENING FOR VIRAL DISEASE: ICD-10-CM

## 2022-05-25 DIAGNOSIS — J06.9 VIRAL URI WITH COUGH: Primary | ICD-10-CM

## 2022-05-25 PROCEDURE — 1123F ACP DISCUSS/DSCN MKR DOCD: CPT | Performed by: INTERNAL MEDICINE

## 2022-05-25 PROCEDURE — 99213 OFFICE O/P EST LOW 20 MIN: CPT | Performed by: INTERNAL MEDICINE

## 2022-05-25 RX ORDER — FLUTICASONE PROPIONATE 50 MCG
2 SPRAY, SUSPENSION (ML) NASAL DAILY
Qty: 16 G | Refills: 0 | Status: SHIPPED | OUTPATIENT
Start: 2022-05-25

## 2022-05-25 RX ORDER — BENZONATATE 200 MG/1
200 CAPSULE ORAL 3 TIMES DAILY PRN
Qty: 30 CAPSULE | Refills: 0 | Status: SHIPPED | OUTPATIENT
Start: 2022-05-25

## 2022-05-25 ASSESSMENT — PATIENT HEALTH QUESTIONNAIRE - PHQ9
SUM OF ALL RESPONSES TO PHQ QUESTIONS 1-9: 0
SUM OF ALL RESPONSES TO PHQ9 QUESTIONS 1 & 2: 0
SUM OF ALL RESPONSES TO PHQ QUESTIONS 1-9: 0
2. FEELING DOWN, DEPRESSED OR HOPELESS: 0
1. LITTLE INTEREST OR PLEASURE IN DOING THINGS: 0
SUM OF ALL RESPONSES TO PHQ QUESTIONS 1-9: 0
SUM OF ALL RESPONSES TO PHQ QUESTIONS 1-9: 0

## 2022-05-25 NOTE — PROGRESS NOTES
2022    [facility]) Physicians  Internal Medicine  Patient Encounter  Marguerite Allan D.O., [address] -- Audio/Visual (During RVPGP-23 public health emergency)      I discussed at this telephone/video visit is a nontraditional type of visit that we are conducting in lieu of an office visit to minimize patient risk during the coronavirus pandemic. I discussed with the patient that I would not be able to perform a full physical examination, but that in most other respects the visit would be beneficial to his/her continued medical care. He/She again gave verbal consent for us to conduct this type of visit. Chief Complaint   Patient presents with    Cough     x3 days, not tested for covid     Headache    Nasal Congestion    Fever     100.8 this morning          HPI:    Rainey Gowers (:  1950) has requested an audio/video evaluation for the following concern(s): Cough    73 y.o. male being evaluated via virtual video visit due to the coronavirus pandemic emergency and public health crisis and inability to see the patient face-to-face in the office. Evaluated today with complaint of cough since Monday. Patient states he woke up Monday morning with a dry cough. At that time he had no fever, chills, sweats. Yesterday the cough had worsened and he felt chilled. His temperature had initially been okay until this morning when his temperature was 99.7. He is also had a sore throat and a runny nose. He denies any myalgias. He denies any diarrhea. He denies any ill contacts. Past Medical History:   Diagnosis Date    BPH     Cholelithiasis     H. pylori infection 10/2017    Dr. Dionisio Ames Herpes     Hyperlipidemia     Hypertension     Obesity (BMI 30-39. 9) 2017    Primary osteoarthritis of right hip 2019    Psoriasis     Type II or unspecified type diabetes mellitus without mention of complication, not stated as uncontrolled        Medication Sig   sitaGLIPtan-metFORMIN (JANUMET)  MG per tablet TAKE ONE TABLET BY MOUTH TWICE A DAY WITH MEALS   lisinopril (PRINIVIL;ZESTRIL) 30 MG tablet TAKE ONE TABLET BY MOUTH DAILY   simvastatin (ZOCOR) 20 MG tablet TAKE ONE TABLET BY MOUTH EVERY MORNING   tamsulosin (FLOMAX) 0.4 MG capsule TAKE ONE CAPSULE BY MOUTH EVERY EVENING   betamethasone dipropionate 0.05 % cream Apply topically 2 times daily for 14 days as needed for psoriasis plaques. Do not use for more than 14 consecutive days. terbinafine (LAMISIL AT) 1 % cream Apply topically 2 times daily for 2-4 weeks. blood glucose test strips (FREESTYLE LITE) strip TEST TWICE daily   Blood Glucose Monitoring Suppl ([employer] BLOOD GLUCOSE METER) w/Device KIT 1 Device by Does not apply route daily Please dispense insurance/pt preference. E11.9    Check blood sugars QD   senna-docusate (PERICOLACE) 8.6-50 MG per tablet Take 2 tablets by mouth 2 times daily   Cholecalciferol (VITAMIN D3) 1000 UNITS TABS Take 1 tablet by mouth daily. aspirin EC 81 MG EC tablet Take 1 tablet by mouth daily. lisinopril (PRINIVIL;ZESTRIL) 30 MG tablet Take 1 tablet by mouth daily         Review of Systems  As per HPI      PHYSICAL EXAMINATION:  [ INSTRUCTIONS:  \"[x]\" Indicates a positive item  \"[]\" Indicates a negative item  -- DELETE ALL ITEMS NOT EXAMINED]  Vital Signs: (As obtained by patient/caregiver or practitioner observation)    There were no vitals filed for this visit. There is no height or weight on file to calculate BMI. Wt Readings from Last 3 Encounters:   05/09/22 267 lb (121.1 kg)   02/22/22 265 lb (120.2 kg)   10/25/21 263 lb (119.3 kg)     BP Readings from Last 3 Encounters:   05/09/22 128/76   02/22/22 138/76   10/25/21 126/68           Physical Exam  Constitutional:       Appearance: Normal appearance. Pulmonary:      Effort: Pulmonary effort is normal. No respiratory distress.    Neurological:      Mental Status: He is alert and oriented to person, place, and time. Other pertinent observable physical exam findings-     Due to this being a TeleHealth encounter, evaluation of the following organ systems is limited: Vitals/Constitutional/EENT/Resp/CV/GI//MS/Neuro/Skin/Heme-Lymph-Imm. ASSESSMENT/PLAN:  1. Viral URI with cough  Advised to push fluids and get plenty of rest  Patient will be tested for COVID-19  Advised patient to call should symptoms persist, worsen or if new symptoms develop. - benzonatate (TESSALON) 200 MG capsule; Take 1 capsule by mouth 3 times daily as needed for Cough  Dispense: 30 capsule; Refill: 0  - fluticasone (FLONASE) 50 MCG/ACT nasal spray; 2 sprays by Each Nostril route daily  Dispense: 16 g; Refill: 0    2. Encounter for screening for viral disease    - COVID-19; Future  - COVID-19      No follow-ups on file. An  electronic signature was used to authenticate this note. --Krissy Pan, DO on 5/25/2022 at 3:17 PM    119}    Pursuant to the emergency declaration under the [facility] waiver authority and the [employer] Act, this Virtual  Visit was conducted, with patient's consent, to reduce the patient's risk of exposure to COVID-19 and provide continuity of care for an established patient. Services were provided through a video synchronous discussion virtually to substitute for in-person clinic visit.

## 2022-05-26 DIAGNOSIS — U07.1 COVID-19 VIRUS INFECTION: Primary | ICD-10-CM

## 2022-05-26 LAB — SARS-COV-2: DETECTED

## 2022-07-15 DIAGNOSIS — L40.9 PSORIASIS: ICD-10-CM

## 2022-07-15 RX ORDER — BETAMETHASONE DIPROPIONATE 0.5 MG/G
CREAM TOPICAL
Qty: 80 G | Refills: 0 | Status: SHIPPED | OUTPATIENT
Start: 2022-07-15

## 2022-07-15 NOTE — TELEPHONE ENCOUNTER
Patient is requesting the following please be prescribed     betamethasone dipropionate 0.05 % cream Apply topically 2 times daily for 14 days as needed for psoriasis plaques. Do not use for more than 14 consecutive days. in an ointment form     Please call in to: [id number] [address], [address] [facility], [place] [address]    Patient is requesting to be contact at phone # provided when prescription has been called in.

## 2022-09-06 LAB
ALBUMIN SERPL-MCNC: 4.4 G/DL
ALP BLD-CCNC: 48 U/L
ALT SERPL-CCNC: 34 U/L
ANION GAP SERPL CALCULATED.3IONS-SCNC: NORMAL MMOL/L
AST SERPL-CCNC: 21 U/L
AVERAGE GLUCOSE: NORMAL
BASOPHILS ABSOLUTE: 52 /ΜL
BASOPHILS RELATIVE PERCENT: 0.8 %
BILIRUB SERPL-MCNC: 0.5 MG/DL (ref 0.1–1.4)
BUN BLDV-MCNC: 14 MG/DL
CALCIUM SERPL-MCNC: 9.7 MG/DL
CHLORIDE BLD-SCNC: 104 MMOL/L
CHOLESTEROL, TOTAL: 152 MG/DL
CHOLESTEROL/HDL RATIO: 2.6
CO2: 28 MMOL/L
CREAT SERPL-MCNC: 0.76 MG/DL
EOSINOPHILS ABSOLUTE: 111 /ΜL
EOSINOPHILS RELATIVE PERCENT: 1.7 %
GFR CALCULATED: NORMAL
GLUCOSE BLD-MCNC: 127 MG/DL
HBA1C MFR BLD: 6.5 %
HCT VFR BLD CALC: 41.8 % (ref 41–53)
HDLC SERPL-MCNC: 59 MG/DL (ref 35–70)
HEMOGLOBIN: 13.6 G/DL (ref 13.5–17.5)
LDL CHOLESTEROL CALCULATED: 72 MG/DL (ref 0–160)
LYMPHOCYTES ABSOLUTE: 1456 /ΜL
LYMPHOCYTES RELATIVE PERCENT: 22.4 %
MCH RBC QN AUTO: 30.3 PG
MCHC RBC AUTO-ENTMCNC: 32.5 G/DL
MCV RBC AUTO: 93.1 FL
MONOCYTES ABSOLUTE: 540 /ΜL
MONOCYTES RELATIVE PERCENT: 8.3 %
NEUTROPHILS ABSOLUTE: 4342 /ΜL
NEUTROPHILS RELATIVE PERCENT: 66.8 %
NONHDLC SERPL-MCNC: 93 MG/DL
PLATELET # BLD: 216 K/ΜL
PMV BLD AUTO: 11.1 FL
POTASSIUM SERPL-SCNC: 4.4 MMOL/L
PROSTATE SPECIFIC ANTIGEN: 2.08 NG/ML
RBC # BLD: 4.49 10^6/ΜL
SODIUM BLD-SCNC: 142 MMOL/L
TOTAL PROTEIN: 7.6
TRIGL SERPL-MCNC: 128 MG/DL
TSH SERPL DL<=0.05 MIU/L-ACNC: 2.03 UIU/ML
VLDLC SERPL CALC-MCNC: NORMAL MG/DL
WBC # BLD: 6.5 10^3/ML

## 2022-09-07 LAB
A/G RATIO: 1.4 (CALC) (ref 1–2.5)
ALBUMIN SERPL-MCNC: 4.4 G/DL (ref 3.6–5.1)
ALP BLD-CCNC: 48 U/L (ref 35–144)
ALT SERPL-CCNC: 34 U/L (ref 9–46)
AST SERPL-CCNC: 21 U/L (ref 10–35)
BASOPHILS ABSOLUTE: 52 CELLS/UL (ref 0–200)
BASOPHILS RELATIVE PERCENT: 0.8 %
BILIRUB SERPL-MCNC: 0.5 MG/DL (ref 0.2–1.2)
BUN / CREAT RATIO: ABNORMAL (CALC) (ref 6–22)
BUN BLDV-MCNC: 14 MG/DL (ref 7–25)
CALCIUM SERPL-MCNC: 9.7 MG/DL (ref 8.6–10.3)
CHLORIDE BLD-SCNC: 104 MMOL/L (ref 98–110)
CHOLESTEROL, TOTAL: 152 MG/DL
CHOLESTEROL/HDL RATIO: 2.6 (CALC)
CO2: 28 MMOL/L (ref 20–32)
CREAT SERPL-MCNC: 0.76 MG/DL (ref 0.7–1.28)
EOSINOPHILS ABSOLUTE: 111 CELLS/UL (ref 15–500)
EOSINOPHILS RELATIVE PERCENT: 1.7 %
ESTIMATED GLOMERULAR FILTRATION RATE CREATININE EQUATION: 97 ML/MIN/1.73M2
GLOBULIN: 3.2 G/DL (CALC) (ref 1.9–3.7)
GLUCOSE BLD-MCNC: 127 MG/DL (ref 65–99)
HBA1C MFR BLD: 6.5 % OF TOTAL HGB
HCT VFR BLD CALC: 41.8 % (ref 38.5–50)
HDLC SERPL-MCNC: 59 MG/DL
HEMOGLOBIN: 13.6 G/DL (ref 13.2–17.1)
LDL CHOLESTEROL CALCULATED: 72 MG/DL (CALC)
LYMPHOCYTES ABSOLUTE: 1456 CELLS/UL (ref 850–3900)
LYMPHOCYTES RELATIVE PERCENT: 22.4 %
MCH RBC QN AUTO: 30.3 PG (ref 27–33)
MCHC RBC AUTO-ENTMCNC: 32.5 G/DL (ref 32–36)
MCV RBC AUTO: 93.1 FL (ref 80–100)
MONOCYTES ABSOLUTE: 540 CELLS/UL (ref 200–950)
MONOCYTES RELATIVE PERCENT: 8.3 %
NEUTROPHILS ABSOLUTE: 4342 CELLS/UL (ref 1500–7800)
NONHDLC SERPL-MCNC: 93 MG/DL (CALC)
PDW BLD-RTO: 13.3 % (ref 11–15)
PLATELET # BLD: 216 THOUSAND/UL (ref 140–400)
PMV BLD AUTO: 11.1 FL (ref 7.5–12.5)
POTASSIUM SERPL-SCNC: 4.4 MMOL/L (ref 3.5–5.3)
PROSTATE SPECIFIC ANTIGEN: 2.08 NG/ML
RBC # BLD: 4.49 MILLION/UL (ref 4.2–5.8)
SEGMENTED NEUTROPHILS RELATIVE PERCENT: 66.8 %
SODIUM BLD-SCNC: 142 MMOL/L (ref 135–146)
TOTAL PROTEIN: 7.6 G/DL (ref 6.1–8.1)
TRIGL SERPL-MCNC: 128 MG/DL
TSH ULTRASENSITIVE: 2.03 MIU/L (ref 0.4–4.5)
WBC # BLD: 6.5 THOUSAND/UL (ref 3.8–10.8)

## 2022-09-14 ENCOUNTER — TELEMEDICINE (OUTPATIENT)
Dept: INTERNAL MEDICINE CLINIC | Age: 72
End: 2022-09-14
Payer: MEDICARE

## 2022-09-14 DIAGNOSIS — J06.9 URI WITH COUGH AND CONGESTION: Primary | ICD-10-CM

## 2022-09-14 DIAGNOSIS — J34.89 SINUS PRESSURE: ICD-10-CM

## 2022-09-14 DIAGNOSIS — L40.9 PSORIASIS: ICD-10-CM

## 2022-09-14 PROCEDURE — 99213 OFFICE O/P EST LOW 20 MIN: CPT | Performed by: INTERNAL MEDICINE

## 2022-09-14 PROCEDURE — 1123F ACP DISCUSS/DSCN MKR DOCD: CPT | Performed by: INTERNAL MEDICINE

## 2022-09-14 RX ORDER — BETAMETHASONE DIPROPIONATE 0.05 %
OINTMENT (GRAM) TOPICAL
Qty: 50 G | Refills: 0 | Status: SHIPPED | OUTPATIENT
Start: 2022-09-14 | End: 2022-10-14

## 2022-09-14 NOTE — PROGRESS NOTES
2022    [facility]) Physicians  Internal Medicine  Patient Encounter  [address] [facility], D.O., [address] -- Audio/Visual (During UWDGI-17 public health emergency)      I discussed at this telephone/video visit is a nontraditional type of visit that we are conducting in lieu of an office visit to minimize patient risk during the coronavirus pandemic. I discussed with the patient that I would not be able to perform a full physical examination, but that in most other respects the visit would be beneficial to his/her continued medical care. He/She again gave verbal consent for us to conduct this type of visit. Chief Complaint   Patient presents with    Cough     Started Monday - no covid     Nasal Congestion     Tuesday started     Pharyngitis     Started          HPI:    Mercy Jaja (:  1950) has requested an audio/video evaluation for the following concern(s): Cough, nasal congestion, sore throat    81 y.o. male being evaluated via virtual video visit due to the coronavirus pandemic emergency and public health crisis and inability to see the patient face-to-face in the office. Patient is being evaluated today with nasal congestion, sore throat and cough. Symptoms started on  with a sore throat. Symptoms have progressed to include nasal congestion and dry cough. On Monday, his throat pain was gone. Pt does report having some left sided sinus discomfort. He has had sinus surgery in the past.  Yesterday, his nose was runny with green nasal discharge. He does have some sneezing. He does not have a COVID test.  He is much better today. He has tried to get in to ENT. Several months ago he had a left sided head pain. This resolved over 3-4 weeks. He never called. BP was OK. Sugars are OK. No fever, chills. Yesterday Temp was 99. Pt was diagnosed with COVID-19 in May 2022. He has AWV in November.       Patient also wants the Patient-Reported Temperature 99.7          Wt Readings from Last 3 Encounters:   05/09/22 267 lb (121.1 kg)   02/22/22 265 lb (120.2 kg)   10/25/21 263 lb (119.3 kg)     BP Readings from Last 3 Encounters:   05/09/22 128/76   02/22/22 138/76   10/25/21 126/68           Physical Exam  Constitutional:       Appearance: Normal appearance. Pulmonary:      Effort: Pulmonary effort is normal. No respiratory distress. Neurological:      Mental Status: He is alert and oriented to person, place, and time. Psychiatric:         Thought Content: Thought content normal.         Other pertinent observable physical exam findings-     Due to this being a TeleHealth encounter, evaluation of the following organ systems is limited: Vitals/Constitutional/EENT/Resp/CV/GI//MS/Neuro/Skin/Heme-Lymph-Imm. ASSESSMENT/PLAN:  1. URI with cough and congestion  Suspect Viral illness  Consider Sinusitis  Improving. No new meds   - COVID-19; Future    2. Sinus pressure  May need CT sinus  If symptoms persist, try antibiotics  - COVID-19; Future    No follow-ups on file. An  electronic signature was used to authenticate this note. --Og Collazo DO on 9/14/2022 at 10:32 AM    119}    Pursuant to the emergency declaration under the [facility], [facility] waiver authority and the [employer] Act, this Virtual  Visit was conducted, with patient's consent, to reduce the patient's risk of exposure to COVID-19 and provide continuity of care for an established patient. Services were provided through a video synchronous discussion virtually to substitute for in-person clinic visit.

## 2022-09-15 LAB — SARS-COV-2, PCR: NOT DETECTED

## 2022-09-16 ENCOUNTER — TELEPHONE (OUTPATIENT)
Dept: INTERNAL MEDICINE CLINIC | Age: 72
End: 2022-09-16

## 2022-09-16 DIAGNOSIS — J06.9 URI WITH COUGH AND CONGESTION: Primary | ICD-10-CM

## 2022-09-16 RX ORDER — DOXYCYCLINE HYCLATE 100 MG
100 TABLET ORAL 2 TIMES DAILY
Qty: 14 TABLET | Refills: 0 | Status: SHIPPED | OUTPATIENT
Start: 2022-09-16 | End: 2022-09-23

## 2022-09-16 NOTE — TELEPHONE ENCOUNTER
Patient was seen in a VV on 9/14/22. He states that Dr. Gurdeep Farfan told he he would send in an antibiotic to the pharmacy. Patient has not received antibiotic. Please contact patient to advise.

## 2022-09-16 NOTE — TELEPHONE ENCOUNTER
Discussed with pt. Medication called in, but he is to take only if getting worse. No need to call him back at this time.

## 2022-09-16 NOTE — TELEPHONE ENCOUNTER
Spoke with Dr. Brooke Angelo on the phone prior to calling patient. Was advised to explain to the patient he was not getting an antibiotic until Monday if he was not getting any better. Spoke with patient, his covid test is negative. He stated multiple times that Dr. Brooke Angelo would give him an antibiotic if his test was negative. I tried my best to explain that the only way he could get an antibiotic is if the symptoms are getting worse and could get this on Monday if he was not feeling better. He stated is cough is better but the nasal drip is not and has a lot of green mucus.

## 2022-09-27 ENCOUNTER — TELEPHONE (OUTPATIENT)
Dept: INTERNAL MEDICINE CLINIC | Age: 72
End: 2022-09-27

## 2022-09-27 NOTE — TELEPHONE ENCOUNTER
----- Message from [facility] sent at 9/27/2022  9:48 AM EDT -----  Subject: Message to Provider    QUESTIONS  Information for Provider? Pt would like to talk to Eneida Agustin to find out if   she received a letter from his insurance. Please call pt and let him know  ---------------------------------------------------------------------------  --------------  Carley Fisher INFO  [phone]; OK to leave message on voicemail  ---------------------------------------------------------------------------  --------------  SCRIPT ANSWERS  Relationship to Patient?  Self

## 2022-10-14 DIAGNOSIS — L40.9 PSORIASIS: ICD-10-CM

## 2022-10-14 RX ORDER — BETAMETHASONE DIPROPIONATE 0.05 %
OINTMENT (GRAM) TOPICAL
Qty: 45 G | Refills: 0 | Status: SHIPPED | OUTPATIENT
Start: 2022-10-14

## 2022-10-25 ENCOUNTER — TELEPHONE (OUTPATIENT)
Dept: INTERNAL MEDICINE CLINIC | Age: 72
End: 2022-10-25

## 2022-10-25 DIAGNOSIS — E78.5 HYPERLIPIDEMIA, UNSPECIFIED HYPERLIPIDEMIA TYPE: ICD-10-CM

## 2022-10-25 RX ORDER — SIMVASTATIN 20 MG
TABLET ORAL
Qty: 90 TABLET | Refills: 1 | Status: SHIPPED | OUTPATIENT
Start: 2022-10-25

## 2022-10-25 NOTE — TELEPHONE ENCOUNTER
Patient is requesting a re-fill on his Simvastatin 20 mg be sent to [facility] on [address]. No longer on active list. Last filled 5/9/2022. Please call him to discuss.

## 2022-11-09 ENCOUNTER — OFFICE VISIT (OUTPATIENT)
Dept: INTERNAL MEDICINE CLINIC | Age: 72
End: 2022-11-09
Payer: MEDICARE

## 2022-11-09 VITALS
RESPIRATION RATE: 12 BRPM | HEART RATE: 94 BPM | BODY MASS INDEX: 33.27 KG/M2 | DIASTOLIC BLOOD PRESSURE: 72 MMHG | WEIGHT: 251 LBS | SYSTOLIC BLOOD PRESSURE: 124 MMHG | OXYGEN SATURATION: 98 % | HEIGHT: 73 IN

## 2022-11-09 DIAGNOSIS — Z12.11 SCREEN FOR COLON CANCER: ICD-10-CM

## 2022-11-09 DIAGNOSIS — Z00.00 MEDICARE ANNUAL WELLNESS VISIT, SUBSEQUENT: Primary | ICD-10-CM

## 2022-11-09 DIAGNOSIS — R39.12 BENIGN PROSTATIC HYPERPLASIA WITH WEAK URINARY STREAM: ICD-10-CM

## 2022-11-09 DIAGNOSIS — E78.5 HYPERLIPIDEMIA, UNSPECIFIED HYPERLIPIDEMIA TYPE: ICD-10-CM

## 2022-11-09 DIAGNOSIS — N40.1 BENIGN PROSTATIC HYPERPLASIA WITH WEAK URINARY STREAM: ICD-10-CM

## 2022-11-09 DIAGNOSIS — E11.40 CONTROLLED TYPE 2 DIABETES WITH NEUROPATHY (HCC): ICD-10-CM

## 2022-11-09 DIAGNOSIS — L40.9 PSORIASIS: ICD-10-CM

## 2022-11-09 DIAGNOSIS — I10 BENIGN ESSENTIAL HTN: ICD-10-CM

## 2022-11-09 DIAGNOSIS — Z13.6 SCREENING FOR CARDIOVASCULAR CONDITION: ICD-10-CM

## 2022-11-09 DIAGNOSIS — E11.9 TYPE 2 DIABETES MELLITUS WITHOUT COMPLICATION, WITHOUT LONG-TERM CURRENT USE OF INSULIN (HCC): ICD-10-CM

## 2022-11-09 DIAGNOSIS — D12.6 ADENOMATOUS POLYP OF COLON, UNSPECIFIED PART OF COLON: ICD-10-CM

## 2022-11-09 PROCEDURE — G0439 PPPS, SUBSEQ VISIT: HCPCS | Performed by: INTERNAL MEDICINE

## 2022-11-09 PROCEDURE — G0008 ADMIN INFLUENZA VIRUS VAC: HCPCS | Performed by: INTERNAL MEDICINE

## 2022-11-09 PROCEDURE — G0446 INTENS BEHAVE THER CARDIO DX: HCPCS | Performed by: INTERNAL MEDICINE

## 2022-11-09 PROCEDURE — 3074F SYST BP LT 130 MM HG: CPT | Performed by: INTERNAL MEDICINE

## 2022-11-09 PROCEDURE — 3078F DIAST BP <80 MM HG: CPT | Performed by: INTERNAL MEDICINE

## 2022-11-09 PROCEDURE — 90694 VACC AIIV4 NO PRSRV 0.5ML IM: CPT | Performed by: INTERNAL MEDICINE

## 2022-11-09 PROCEDURE — 1123F ACP DISCUSS/DSCN MKR DOCD: CPT | Performed by: INTERNAL MEDICINE

## 2022-11-09 PROCEDURE — 3044F HG A1C LEVEL LT 7.0%: CPT | Performed by: INTERNAL MEDICINE

## 2022-11-09 RX ORDER — BETAMETHASONE DIPROPIONATE 0.05 %
OINTMENT (GRAM) TOPICAL
Qty: 80 G | Refills: 1 | Status: SHIPPED | OUTPATIENT
Start: 2022-11-09

## 2022-11-09 RX ORDER — TAMSULOSIN HYDROCHLORIDE 0.4 MG/1
CAPSULE ORAL
Qty: 90 CAPSULE | Refills: 1 | Status: SHIPPED | OUTPATIENT
Start: 2022-11-09

## 2022-11-09 RX ORDER — SITAGLIPTIN AND METFORMIN HYDROCHLORIDE 500; 50 MG/1; MG/1
TABLET, FILM COATED ORAL
Qty: 82 TABLET | Refills: 0 | Status: SHIPPED | OUTPATIENT
Start: 2022-11-09

## 2022-11-09 RX ORDER — LISINOPRIL 30 MG/1
TABLET ORAL
Qty: 90 TABLET | Refills: 1 | Status: SHIPPED | OUTPATIENT
Start: 2022-11-09

## 2022-11-09 ASSESSMENT — PATIENT HEALTH QUESTIONNAIRE - PHQ9
1. LITTLE INTEREST OR PLEASURE IN DOING THINGS: 0
2. FEELING DOWN, DEPRESSED OR HOPELESS: 0
SUM OF ALL RESPONSES TO PHQ QUESTIONS 1-9: 0
SUM OF ALL RESPONSES TO PHQ9 QUESTIONS 1 & 2: 0

## 2022-11-09 ASSESSMENT — LIFESTYLE VARIABLES
HOW OFTEN DO YOU HAVE A DRINK CONTAINING ALCOHOL: MONTHLY OR LESS
HOW MANY STANDARD DRINKS CONTAINING ALCOHOL DO YOU HAVE ON A TYPICAL DAY: 1 OR 2

## 2022-11-09 NOTE — PROGRESS NOTES
[facility]) Physicians  Internal Medicine  Patient Encounter  Leo Pérez D.O., [facility] Annual Wellness Visit  Yudelka Bolanos  11/9/2022    Subjective Yudelka ArmendarizRatcliff is here for [facility]      Medical/Surgical Histories     Past Medical History:   Diagnosis Date    BPH     Cholelithiasis     H. pylori infection 10/2017    Dr. Yuliana Ortiz    Herpes     Hyperlipidemia     Hypertension     Obesity (BMI 30-39. 9) 8/18/2017    Primary osteoarthritis of right hip 7/17/2019    Psoriasis     Type II or unspecified type diabetes mellitus without mention of complication, not stated as uncontrolled           Past Surgical History:   Procedure Laterality Date    COLONOSCOPY  2006    COLONOSCOPY  10/16/2017    Dr. Odalys Nichols  08/06/2019    Dr. Chris Vance  10/16/2017    Dr. Yuliana Ortiz           Medications/Allergies     Current Outpatient Medications   Medication Sig    simvastatin (ZOCOR) 20 MG tablet TAKE ONE TABLET BY MOUTH EVERY MORNING    betamethasone dipropionate 0.05 % ointment APPLY TOPICALLY TWO TIMES A DAY    betamethasone dipropionate 0.05 % cream Apply topically 2 times daily for 14 days as needed for psoriasis plaques. Do not use for more than 14 consecutive days. benzonatate (TESSALON) 200 MG capsule Take 1 capsule by mouth 3 times daily as needed for Cough    fluticasone (FLONASE) 50 MCG/ACT nasal spray 2 sprays by Each Nostril route daily    sitaGLIPtan-metFORMIN (JANUMET)  MG per tablet TAKE ONE TABLET BY MOUTH TWICE A DAY WITH MEALS    lisinopril (PRINIVIL;ZESTRIL) 30 MG tablet TAKE ONE TABLET BY MOUTH DAILY    tamsulosin (FLOMAX) 0.4 MG capsule TAKE ONE CAPSULE BY MOUTH EVERY EVENING    terbinafine (LAMISIL AT) 1 % cream Apply topically 2 times daily for 2-4 weeks.     blood glucose test strips (FREESTYLE LITE) strip TEST TWICE daily    Blood Glucose Monitoring Suppl (ACURA BLOOD GLUCOSE METER) w/Device KIT 1 Device by Does not apply route daily Please dispense insurance/pt preference. E11.9    Check blood sugars QD    senna-docusate (PERICOLACE) 8.6-50 MG per tablet Take 2 tablets by mouth 2 times daily    Cholecalciferol (VITAMIN D3) 1000 UNITS TABS Take 1 tablet by mouth daily. aspirin EC 81 MG EC tablet Take 1 tablet by mouth daily. No current facility-administered medications for this visit. No Known Allergies      Substance Use History     Social History     Tobacco Use    Smoking status: Never    Smokeless tobacco: Never   Substance Use Topics    Alcohol use: Yes     Comment: occ    Drug use: No          Family History     Family History   Problem Relation Age of Onset    Cancer Mother         colon    Diabetes Mother     Cancer Father         colon    Cancer Brother         gu            CARE TEAM  (Including outside providers/suppliers regularly involved in providing care):   Patient Care Team:  Mac Oneil DO as PCP - General (Internal Medicine)  Mac Oneil DO [facility] PCP [facility]  Denver Garcia MD as Surgeon (General Surgery)      Patient's complete Health Risk Assessment and screening values have been reviewed and are found in [address]. The following risks were reviewed today and where indicated follow up appointments were made and/or referrals ordered.     Positive Risk Factor Screenings with Interventions:             General Health and ACP:  General  In general, how would you say your health is?: Good  In the past 7 days, have you experienced any of the following: New or Increased Pain, New or Increased Fatigue, Loneliness, Social Isolation, Stress or Anger?: No  Do you get the social and emotional support that you need?: Yes    Advance Directives       Power of  Living Will ACP-Advance Directive ACP-Power of     Not on File Not on File Not on File Not on File        General Health Risk Interventions:  No Living Will: ACP documents already completed- patient asked to provide copy to the office    Health Habits/Nutrition:  Physical Activity: Inactive    Days of Exercise per Week: 0 days    Minutes of Exercise per Session: 0 min     Have you lost any weight without trying in the past 3 months?: (!) Yes  Body mass index: (!) 33.11  Have you seen the dentist within the past year?: Yes  Health Habits/Nutrition Interventions:  Inadequate physical activity:  Discussed the importance of becoming more physically active. We discussed the dangers of leaving a sedentary lifestyle. We discussed different strategies for more exercise at home  Nutritional issues:  educational materials for healthy, well-balanced diet provided    Hearing/Vision:  Do you or your family notice any trouble with your hearing that hasn't been managed with hearing aids?: (!) Yes  Do you have difficulty driving, watching TV, or doing any of your daily activities because of your eyesight?: No  Have you had an eye exam within the past year?: Yes  No results found. Hearing/Vision Interventions:  Hearing concerns:  patient declines any further evaluation/treatment for hearing issues  Vision concerns:  patient encouraged to make appointment with his/her eye specialist    Safety:  Do you have working smoke detectors?: (!) No  Do you have any tripping hazards - loose or unsecured carpets or rugs?: No  Do you have any tripping hazards - clutter in doorways, halls, or stairs?: No  Do you have either shower bars, grab bars, non-slip mats or non-slip surfaces in your shower or bathtub?: Yes  Do all of your stairways have a railing or banister?: Yes  Do you always fasten your seatbelt when you are in a car?: Yes  Safety Interventions:  Home safety tips provided         ROS:  ENDO: In Donut hole. Janumet expensive. HEENT: + Tinnitus. Hearing loss. Declines evaluation.          Hemoglobin A1C   Date Value Ref Range Status   09/06/2022 6.5 (H) <5.7 % of total Hgb Final     Comment:     For someone without known diabetes, a hemoglobin A1c  value of 6.5% or greater indicates that they may have   diabetes and this should be confirmed with a follow-up   test.    For someone with known diabetes, a value <7% indicates   that their diabetes is well controlled and a value   greater than or equal to 7% indicates suboptimal   control. A1c targets should be individualized based on   duration of diabetes, age, comorbid conditions, and   other considerations. Currently, no consensus exists regarding use of  hemoglobin A1c for diagnosis of diabetes for children. OBJECTIVE     Vitals:    11/09/22 1430   BP: 124/72   Pulse: 94   Resp: 12   SpO2: 98%   Weight: 251 lb (113.9 kg)   Height: 6' 1\" (1.854 m)     Body mass index is 33.12 kg/m². Wt Readings from Last 3 Encounters:   11/09/22 251 lb (113.9 kg)   05/09/22 267 lb (121.1 kg)   02/22/22 265 lb (120.2 kg)     BP Readings from Last 3 Encounters:   11/09/22 124/72   05/09/22 128/76   02/22/22 138/76          GEN:  81 y.o. male who is in NAD, A&O. He appears stated age and well nourished. He is cooperative and pleasant. HEAD:  NC/AT, no lesions. EYES:  SHAUN, EOMI, No scleral icterus or conjunctival injection or discharge. Visual fields in tact to confrontation. EARS:  EAC's clear, TM's normal.  NOSE:  Nasal cavity is clear. No mucosal congestion or discharge. Sinuses are nontender. MOUTH & THROAT:  Oral cavity is clear without mucosal lesions. Tongue is midline. Dentition is in good repair. No pharyngeal erythema or exudate. NECK:  Supple. Full ROM. Trachea is midline. No increased JVD. No thyromegaly or nodules. No masses  LYMPH: No C/SC/A/F nodes  CARDIAC:  S1S2 NL. Regular rhythm. No murmur/clicks/rubs. No ectopy. PMI is non-displaced. VASC:  Pedal pulses 2/4. Carotid upstrokes 2+. No bruits noted. PULM:  Lungs are CTA. Symmetric breath sounds noted. AP Diameter NL. GI:  Abdomen is soft and nontender. No distension. No organomegaly. No masses. No pulsatile masses. EXT:  No Cyanosis or clubbing. No edema. SKIN: Warm and dry, normal turgor, no rash or lesions of concern. NEURO:  No focal deficits. MS:  No C/T/L paraspinal tenderness. No scoliosis. No joint effusions. Full joint ROM. PSYCH:  Mood and affect NL. Judgement and insight NL. Reviewed and updated this visit:  Tobacco  Allergies  Meds  Med Hx  Surg Hx  Soc Hx  Fam Hx        ASSESSMENT/PLAN     ASSESSMENT/PLAN:    1. Medicare annual wellness visit, subsequent  All care gaps identified and addressed  Patient refuses hearing evaluation  Lab work provided for next visit  - [place] Intens behave ther cardio dx, 15 minutes []    2. Screening for cardiovascular condition    - [place] Intens behave ther cardio dx, 15 minutes []    3. Type 2 diabetes mellitus without complication, without long-term current use of insulin ([address]  Well-controlled  Patient does not wish to change his medication  Could consider changing to metformin alone 1000 mg twice daily and monitor sugars and eliminate the Januvia  Otherwise may need to wait till he is out of the donut hole. As above  - Microalbumin / Creatinine Urine Ratio  - AFL - Domenica Wolfe MD, (Cataract Surgery, Corneal Transplant, Corneal and External Disease, and Iris Surgery) Ophthalmology, [place]  - sitaGLIPtan-metFORMIN (JANUMET)  MG per tablet; TAKE ONE TABLET BY MOUTH TWICE A DAY WITH MEALS  Dispense: 82 tablet; Refill: 0  - CBC with Auto Differential; Future  - Comprehensive Metabolic Panel; Future  - Lipid Panel; Future  - Hemoglobin A1C; Future    4. Controlled type 2 diabetes with neuropathy ([address]  Above    5. Adenomatous polyp of colon, unspecified part of colon  Due for colonoscopy  Referred to Dr. Will Babinski  Encouraged patient to make this appointment as soon as possible.   We discussed the risk of developing colon cancer and even death  - AFL - Teresa Funk MD, Gastroenterology, [facility]    6. Screen for colon cancer    - AFL - Donald Nava MD, Gastroenterology, [facility]    7. Benign essential HTN    - lisinopril (PRINIVIL;ZESTRIL) 30 MG tablet; TAKE ONE TABLET BY MOUTH DAILY  Dispense: 90 tablet; Refill: 1    8. Benign prostatic hyperplasia with weak urinary stream    - tamsulosin (FLOMAX) 0.4 MG capsule; TAKE ONE CAPSULE BY MOUTH EVERY EVENING  Dispense: 90 capsule; Refill: 1    9. Hyperlipidemia, unspecified hyperlipidemia type    - Comprehensive Metabolic Panel; Future  - Lipid Panel; Future    10. Psoriasis  Reviewed potential adverse side effects of topical steroids. - betamethasone dipropionate 0.05 % ointment; APPLY TOPICALLY TWO TIMES A DAY for 2 weeks then take a break for 1 week. Dispense: 80 g; Refill: 1        Recommendations for Preventive Services Due: see orders and patient instructions/AVS.  Recommended screening schedule for the next 5-10 years is provided to the patient in written form: see Patient Instructions/AVS.    Return for Medicare Annual Wellness Visit in 1 year. Cardiovascular Disease Risk Counseling: Assessed the patient's risk to develop cardiovascular disease and reviewed main risk factors. Reviewed steps to reduce disease risk including:   Quitting tobacco use, reducing amount smoked, or not starting the habit  Making healthy food choices  Being physically active and gradualy increasing activity levels   Reduce weight and determine a healthy BMI goal  Monitor blood pressure and treat if higher than 140/90 mmHg  Maintain blood total cholesterol levels under 5 mmol/l or 190 mg/dl  Maintain LDL cholesterol levels under 3.0 mmol/l or 115 mg/dl   Control blood glucose levels  Consider taking aspirin (75 mg daily), once blood pressure is controlled   Provided a follow up plan.   Time spent (minutes): 15 minutes

## 2022-11-09 NOTE — PATIENT INSTRUCTIONS
Advance Directives: Care Instructions  Overview  An advance directive is a legal way to state your wishes at the end of your life. It tells your family and your doctor what to do if you can't say what you want. There are two main types of advance directives. You can change them any time your wishes change. Living will. This form tells your family and your doctor your wishes about life support and other treatment. The form is also called a declaration. Medical power of . This form lets you name a person to make treatment decisions for you when you can't speak for yourself. This person is called a health care agent (health care proxy, health care surrogate). The form is also called a durable power of  for health care. If you do not have an advance directive, decisions about your medical care may be made by a family member, or by a doctor or a  who doesn't know you. It may help to think of an advance directive as a gift to the people who care for you. If you have one, they won't have to make tough decisions by themselves. Follow-up care is a key part of your treatment and safety. Be sure to make and go to all appointments, and call your doctor if you are having problems. It's also a good idea to know your test results and keep a list of the medicines you take. What should you include in an advance directive? Many states have a unique advance directive form. (It may ask you to address specific issues.) Or you might use a universal form that's approved by many states. If your form doesn't tell you what to address, it may be hard to know what to include in your advance directive. Use the questions below to help you get started. Who do you want to make decisions about your medical care if you are not able to? What life-support measures do you want if you have a serious illness that gets worse over time or can't be cured? What are you most afraid of that might happen?  (Maybe you're afraid of having pain, losing your independence, or being kept alive by machines.)  Where would you prefer to die? (Your home? A hospital? A nursing home?)  Do you want to donate your organs when you die? Do you want certain Rastafarian practices performed before you die? When should you call for help? Be sure to contact your doctor if you have any questions. Where can you learn more? Go to https://chpepiceweb.[facility] org and sign in to your [employer] account. Enter R264 in the [place] box to learn more about \"Advance Directives: Care Instructions. \"     If you do not have an account, please click on the \"Sign Up Now\" link. Current as of: June 16, 2022               Content Version: 13.4  © 1842-6449 Healthwise, Partly. Care instructions adapted under license by [facility] ([facility]). If you have questions about a medical condition or this instruction, always ask your healthcare professional. [place] [address] any warranty or liability for your use of this information. Learning About Boni Reynaga  What is a living will? A living will, also called a declaration, is a legal form. It tells your family and your doctor your wishes when you can't speak for yourself. It's used by the health professionals who will treat you as you near the end of your life or if you get seriously hurt or ill. If you put your wishes in writing, your loved ones and others will know what kind of care you want. They won't need to guess. This can ease your mind and be helpful to others. And you can change or cancel your living will at any time. A living will is not the same as an estate or property will. An estate will explains what you want to happen with your money and property after you die. How do you use it? Keep these facts in mind about how a living will is used. Your living will is used only if you can't speak or make decisions for yourself.  Most often, one or more doctors must certify that you can't speak or decide for yourself before your living will takes effect. If you get better and can speak for yourself again, you can accept or refuse any treatment. It doesn't matter what you said in your living will. Some states may limit your right to refuse treatment in certain cases. For example, you may need to clearly state in your living will that you don't want artificial hydration and nutrition, such as being fed through a tube. Is a living will a legal document? A living will is a legal document. Each state has its own laws about living velazquez. And a living will may be called something else in your state. Here are some things to know about living velazquez. You don't need an  to complete a living will. But legal advice can be helpful if your state's laws are unclear. It can also help if your health history is complicated or your family can't agree on what should be in your living will. You can change your living will at any time. Some people find that their wishes about end-of-life care change as their health changes. If you make big changes to your living will, complete a new form. If you move to another state, make sure that your living will is legal in the state where you now live. In most cases, doctors will respect your wishes even if you have a form from a different state. You might use a universal form that has been approved by many states. This kind of form can sometimes be filled out and stored online. Your digital copy will then be available wherever you have a connection to the internet. The doctors and nurses who need to treat you can find it right away. Your state may offer an online registry. This is another place where you can store your living will online. It's a good idea to get your living will notarized. This means using a person called a  to watch two people sign, or witness, your living will.   What should you know when you create a living will? Here are some questions to ask yourself as you make your living will. Do you know enough about life support methods that might be used? If not, talk to your doctor so you know what might be done if you can't breathe on your own, your heart stops, or you can't swallow. What things would you still want to be able to do after you receive life-support methods? Would you want to be able to walk? To speak? To eat on your own? To live without the help of machines? Do you want certain Hoahaoism practices performed if you become very ill? If you have a choice, where do you want to be cared for? In your home? At a hospital or nursing home? If you have a choice at the end of your life, where would you prefer to die? At home? In a hospital or nursing home? Somewhere else? Would you prefer to be buried or cremated? Do you want your organs to be donated after you die? What should you do with your living will? Make sure that your family members and your health care agent have copies of your living will (also called a declaration). Give your doctor a copy of your living will. Ask to have it kept as part of your medical record. If you have more than one doctor, make sure that each one has a copy. Put a copy of your living will where it can be easily found. For example, some people may put a copy on their refrigerator door. If you are using a digital copy, be sure your doctor, family members, and health care agent know how to find and access it. Where can you learn more? Go to https://SimGymkitty.[employer] org and sign in to your [employer] account. Enter E927 in the [employer] box to learn more about \"Learning About Living Levonne Dopp. \"     If you do not have an account, please click on the \"Sign Up Now\" link. Current as of: June 16, 2022               Content Version: 13.4  © 8401-6252 Healthwise, Incorporated. Care instructions adapted under license by [facility] [place] ([facility]).  If you have questions about a medical condition or this instruction, always ask your healthcare professional. [address] any warranty or liability for your use of this information. Learning About Cutting Calories  How do calories affect your weight? Food gives your body energy. Energy from the food you eat is measured in calories. This energy keeps your heart beating, your brain active, and your muscles working. Your body needs a certain number of calories each day. After your body uses the calories it needs, it stores extra calories as fat. To lose weight safely, you have to eat fewer calories while eating in a healthy way. How many calories do you need each day? The more active you are, the more calories you need. When you are less active, you need fewer calories. How many calories you need each day also depends on several things, including your age and whether you are male or female. Here are some general guidelines for adults:  Less active women and older adults need 1,600 to 2,000 calories each day. Active women and less active men need 2,000 to 2,400 calories each day. Active men need 2,400 to 3,000 calories each day. How can you cut calories and eat healthy meals? Whole grains, vegetables and fruits, and dried beans are good lower-calorie foods. They give you lots of nutrients and fiber. And they fill you up. Sweets, energy drinks, and soda pop are high in calories. They give you few nutrients and no fiber. Try to limit soda pop, fruit juice, and energy drinks. Drink water instead. Some fats can be part of a healthy diet. But cutting back on fats from highly processed foods like fast foods and many snack foods is a good way to lower the calories in your diet. Also, use smaller amounts of fats like butter, margarine, salad dressing, and mayonnaise. Add fresh garlic, lemon, or herbs to your meals to add flavor without adding fat.   Meats and dairy products can be a big source of hidden fats. Try to choose lean or low-fat versions of these products. Fat-free cookies, candies, chips, and frozen treats can still be high in sugar and calories. Some fat-free foods have more calories than regular ones. Eat fat-free treats in moderation, as you would other foods. If your favorite foods are high in fat, salt, sugar, or calories, limit how often you eat them. Eat smaller servings, or look for healthy substitutes. Fill up on fruits, vegetables, and whole grains. Eating at home  Use meat as a side dish instead of as the main part of your meal.  Try main dishes that use whole wheat pasta, brown rice, dried beans, or vegetables. Find ways to cook with little or no fat, such as broiling, steaming, or grilling. Use cooking spray instead of oil. If you use oil, use a monounsaturated oil, such as canola or olive oil. Trim fat from meats before you cook them. Drain off fat after you brown the meat or while you roast it. Chill soups and stews after you cook them. Then skim the fat off the top after it hardens. Eating out  Order foods that are broiled or poached rather than fried or breaded. Cut back on the amount of butter or margarine that you use on bread. Order sauces, gravies, and salad dressings on the side, and use only a little. When you order pasta, choose tomato sauce rather than cream sauce. Ask for salsa with your baked potato instead of sour cream, butter, cheese, or villavicencio. Order meals in a small size instead of upgrading to a large. Share an entree, or take part of your food home to eat as another meal.  Share appetizers and desserts. Where can you learn more? Go to https://F[employer]ck - [employer] Big[employer] Picture[employer]eCross Mediaworks.[employer]. org and sign in to your [employer] account. Enter Z113 in the [employer] box to learn more about \"Learning About Cutting Calories. \"     If you do not have an account, please click on the \"Sign Up Now\" link. Current as of:  May 9, 2022               Content Version: 13.4  © 2001-3175 HealthQalendra, Incorporated. Care instructions adapted under license by [facility] ([place] [facility]). If you have questions about a medical condition or this instruction, always ask your healthcare professional. [address] any warranty or liability for your use of this information. Learning About Low-Carbohydrate Diets  What is a low-carbohydrate diet? A low-carbohydrate (or \"low-carb\") diet limits foods and drinks that have carbohydrates. This includes grains, fruits, milk and yogurt, and starchy vegetables like potatoes, beans, and corn. It also avoids foods and drinks that have added sugar. Instead, low-carb diets include foods that are high in protein and fat. Why might you follow a low-carb diet? Low-carb diets may be used for a variety of reasons, such as for weight loss. People who have diabetes may use a low-carb diet to help manage their blood sugar levels. What should you do before you start the diet? Talk to your doctor before you try any diet. This is even more important if you have health problems like kidney disease, heart disease, or diabetes. Your doctor may suggest that you meet with a registered dietitian. A dietitian can help you make an eating plan that works for you. What foods do you eat on a low-carb diet? On a low-carb diet, you choose foods that are high in protein and fat. Examples of these are:  Meat, poultry, and fish. Eggs. Nuts, such as walnuts, pecans, almonds, and peanuts. Peanut butter and other nut butters. Tofu. Avocado. Abbey Ngozi. Non-starchy vegetables like broccoli, cauliflower, green beans, mushrooms, peppers, lettuce, and spinach. Unsweetened non-dairy milks like almond milk and coconut milk. Cheese, cottage cheese, and cream cheese. Where can you learn more? Go to https://young.healthSolexant. org and sign in to your "[employer] account.  Enter C335 in the [facility] box to learn more about \"Learning About Low-Carbohydrate Diets. \"     If you do not have an account, please click on the \"Sign Up Now\" link. Current as of: May 9, 2022               Content Version: 13.4  © 2006-2022 Healthwise, Incorporated. Care instructions adapted under license by [facility] ([facility]). If you have questions about a medical condition or this instruction, always ask your healthcare professional. [address] any warranty or liability for your use of this information. Eating Healthy Foods: Care Instructions  Your Care Instructions     Eating healthy foods can help lower your risk for disease. Healthy food gives you energy and keeps your heart strong, your brain active, your muscles working, and your bones strong. A healthy diet includes a variety of foods from the basic food groups: grains, vegetables, fruits, milk and milk products, and meat and beans. Some people may eat more of their favorite foods from only one food group and, as a result, miss getting the nutrients they need. So, it is important to pay attention not only to what you eat but also to what you are missing from your diet. You can eat a healthy, balanced diet by making a few small changes. Follow-up care is a key part of your treatment and safety. Be sure to make and go to all appointments, and call your doctor if you are having problems. It's also a good idea to know your test results and keep a list of the medicines you take. How can you care for yourself at home? Look at what you eat  Keep a food diary for a week or two and record everything you eat or drink. Track the number of servings you eat from each food group. For a balanced diet every day, eat a variety of:  6 or more ounce-equivalents of grains, such as cereals, breads, crackers, rice, or pasta, every day.  An ounce-equivalent is 1 slice of bread, 1 cup of ready-to-eat cereal, or ½ cup of cooked rice, cooked pasta, or cooked cereal.  2½ cups of vegetables, especially:  Dark-green vegetables such as broccoli and spinach. Orange vegetables such as carrots and sweet potatoes. Dry beans (such as dejesus and kidney beans) and peas (such as lentils). 2 cups of fresh, frozen, or canned fruit. A small apple or 1 banana or orange equals 1 cup.  3 cups of nonfat or low-fat milk, yogurt, or other milk products. 5½ ounces of meat and beans, such as chicken, fish, lean meat, beans, nuts, and seeds. One egg, 1 tablespoon of peanut butter, ½ ounce nuts or seeds, or ¼ cup of cooked beans equals 1 ounce of meat. Learn how to read food labels for serving sizes and ingredients. Fast-food and convenience-food meals often contain few or no fruits or vegetables. Make sure you eat some fruits and vegetables to make the meal more nutritious. Look at your food diary. For each food group, add up what you have eaten and then divide the total by the number of days. This will give you an idea of how much you are eating from each food group. See if you can find some ways to change your diet to make it more healthy. Start small  Do not try to make dramatic changes to your diet all at once. You might feel that you are missing out on your favorite foods and then be more likely to fail. Start slowly, and gradually change your habits. Try some of the following:  Use whole wheat bread instead of white bread. Use nonfat or low-fat milk instead of whole milk. Eat brown rice instead of white rice, and eat whole wheat pasta instead of white-flour pasta. Try low-fat cheeses and low-fat yogurt. Add more fruits and vegetables to meals and have them for snacks. Add lettuce, tomato, cucumber, and onion to sandwiches. Add fruit to yogurt and cereal.  Enjoy food  You can still eat your favorite foods. You just may need to eat less of them. If your favorite foods are high in fat, salt, and sugar, limit how often you eat them, but do not cut them out entirely. Eat a wide variety of foods.   Make healthy choices when eating out  The type of restaurant you choose can help you make healthy choices. Even fast-food chains are now offering more low-fat or healthier choices on the menu. Choose smaller portions, or take half of your meal home. When eating out, try:  A veggie pizza with a whole wheat crust or grilled chicken (instead of sausage or pepperoni). Pasta with roasted vegetables, grilled chicken, or marinara sauce instead of cream sauce. A vegetable wrap or grilled chicken wrap. Broiled or poached food instead of fried or breaded items. Make healthy choices easy  Buy packaged, prewashed, ready-to-eat fresh vegetables and fruits, such as baby carrots, salad mixes, and chopped or shredded broccoli and cauliflower. Buy packaged, presliced fruits, such as melon or pineapple. Choose 100% fruit or vegetable juice instead of soda. Limit juice intake to 4 to 6 oz (½ to ¾ cup) a day. Blend low-fat yogurt, fruit juice, and canned or frozen fruit to make a smoothie for breakfast or a snack. Where can you learn more? Go to https://[employer].peSnaptracs.[employer]rum fÃ[employer]kom[employer]ikationste[employer]ik. org and sign in to your listedplaces account. Enter E958 in the [facility] box to learn more about \"Eating Healthy Foods: Care Instructions. \"     If you do not have an account, please click on the \"Sign Up Now\" link. Current as of: May 9, 2022               Content Version: 13.4  © 2006-2022 Healthwise, Incorporated. Care instructions adapted under license by [facility] ([facility]). If you have questions about a medical condition or this instruction, always ask your healthcare professional. [place] [address] any warranty or liability for your use of this information. Abnormal Weight Gain: Care Instructions  Your Care Instructions     There are two types of weight gain--normal and abnormal. Normal weight gain is usually caused by eating too much or exercising too little. It can also happen as you get older. But abnormal weight gain has other causes. It can be caused by a problem with your thyroid gland, called hypothyroidism. Or it can be caused by a problem with your adrenal glands, called Cushing's syndrome. Or your body could be holding too much fluid because of kidney, liver, or heart problems. In some cases, a medicine you take can cause you to gain weight. You can work with your doctor to find out the cause of your weight gain. You will probably need tests to do this. Follow-up care is a key part of your treatment and safety. Be sure to make and go to all appointments, and call your doctor if you are having problems. It's also a good idea to know your test results and keep a list of the medicines you take. How can you care for yourself at home? Weigh yourself at the same time every day. It's best to do it first thing in the morning after you empty your bladder. Be sure to always wear the same amount of clothing. Write down any changes in your weight and the possible causes. Discuss these with your doctor. Your doctor may want you to change your diet and exercise habits. A good way to lose weight is to reduce calories and increase exercise. Walking is an easy way to get exercise. Try to walk a little longer every day. You also may want to swim, bike, or do other activities. Ask your doctor if you should see a dietitian. This is a person who can help you plan meals that work best for your lifestyle. If your doctor prescribed medicines, take them exactly as prescribed. Call your doctor if you think you are having a problem with your medicine. You will get more details on the specific medicines your doctor prescribes. When should you call for help? Watch closely for changes in your health, and be sure to contact your doctor if:    You do not get better as expected. You continue to gain weight. Where can you learn more? Go to https://chpepiceweb.[place]. org and sign in to your [employer] account.  Enter A175 in the [address] [employer] box to learn more about \"Abnormal Weight Gain: Care Instructions. \"     If you do not have an account, please click on the \"Sign Up Now\" link. Current as of: April 13, 2022               Content Version: 13.4  © 6016-3612 Healthwise, Incorporated. Care instructions adapted under license by [facility] [address] If you have questions about a medical condition or this instruction, always ask your healthcare professional. [place] [address] any warranty or liability for your use of this information. Personalized Preventive Plan for Caryle Caraway - 11/9/2022  Medicare offers a range of preventive health benefits. Some of the tests and screenings are paid in full while other may be subject to a deductible, co-insurance, and/or copay. Some of these benefits include a comprehensive review of your medical history including lifestyle, illnesses that may run in your family, and various assessments and screenings as appropriate. After reviewing your medical record and screening and assessments performed today your provider may have ordered immunizations, labs, imaging, and/or referrals for you. A list of these orders (if applicable) as well as your Preventive Care list are included within your After Visit Summary for your review. Other Preventive Recommendations: You need to have your diabetes eye exam ASAP. Dr. Anastacio Driver. A preventive dental visit is recommended every 6 months. Try to get at least 150 minutes of exercise per week or 10,000 steps per day on a pedometer . You need to exercise more. Work more on Lifestyle modification including low calorie diet focusing on Low fat/low cholesterol and low carbohydrate intake, along with  increasing cardiovascular (aerobic) exercise. Order or download the FREE \"Exercise & Physical Activity: Your Everyday Guide\" from [employer] on Aging. Call 6-188.447.8486 or search [employer] A[employer] online.   You need 2693-2859 mg of calcium and 0604-9554 IU of vitamin D per day. It is possible to meet your calcium requirement with diet alone, but a vitamin D supplement is usually necessary to meet this goal.  When exposed to the sun, use a sunscreen that protects against both UVA and UVB radiation with an SPF of 30 or greater. Reapply every 2 to 3 hours or after sweating, drying off with a towel, or swimming. Always wear a seat belt when traveling in a car.

## 2022-11-10 LAB
CREATININE URINE: 91.8 MG/DL (ref 39–259)
MICROALBUMIN UR-MCNC: 1.9 MG/DL
MICROALBUMIN/CREAT UR-RTO: 20.7 MG/G (ref 0–30)

## 2023-01-11 DIAGNOSIS — E78.5 HYPERLIPIDEMIA, UNSPECIFIED HYPERLIPIDEMIA TYPE: ICD-10-CM

## 2023-01-11 RX ORDER — SIMVASTATIN 20 MG
TABLET ORAL
Qty: 90 TABLET | Refills: 1 | Status: SHIPPED | OUTPATIENT
Start: 2023-01-11

## 2023-01-11 NOTE — TELEPHONE ENCOUNTER
----- Message from Silvino Johnson sent at 1/11/2023  2:08 PM EST -----  Subject: Refill Request    QUESTIONS  Name of Medication? simvastatin (ZOCOR) 20 MG tablet  Patient-reported dosage and instructions? one a day  How many days do you have left? 14  Preferred Pharmacy? [facility] phone number (if available)? [phone]  Additional Information for Provider? Patient needs this to come from this   pharmacy. Not [facility]. Please have Dr. Bharat Alva nurse call patient. Hard to   understand. He wants a 3 month supply. I think he says he has medication   until 1/25.   ---------------------------------------------------------------------------  --------------  CALL BACK INFO  What is the best way for the office to contact you? OK to leave message on   voicemail  Preferred Call Back Phone Number? [phone]  ---------------------------------------------------------------------------  --------------  SCRIPT ANSWERS  Relationship to Patient?  Self

## 2023-02-03 NOTE — PROGRESS NOTES
PATIENT NAME: Carie Denson     YOB: 1950     TODAY'S DATE: 10/19/2017    Reason for Visit:  Cholelithiasis    Requesting Physician:  Dr. Cory Stephens:              The patient is a 74 y.o. male who presents with epigastric pain. His pain is sometime before he eats and most of the time food helps his pain. He denies symptoms at night. He denies nausea or vomiting. Chief Complaint   Patient presents with    Surgical Consult     Gallbladder     Establish Care       REVIEW OF SYSTEMS:  CONSTITUTIONAL:  negative  HEENT:  negative  RESPIRATORY:  negative  CARDIOVASCULAR:  negative  GASTROINTESTINAL:  negative except for abdominal pain  GENITOURINARY:  negative  HEMATOLOGIC/LYMPHATIC:  negative  NEUROLOGICAL:  negative    PHYSICAL EXAM:  VITALS:  /78   Temp 97.9 °F (36.6 °C)   Ht 6' 1\" (1.854 m)   Wt 259 lb (117.5 kg)   BMI 34.17 kg/m²     CONSTITUTIONAL:  alert, no apparent distress and moderately obese  EYES:  sclera clear  ENT:  normocepalic, without obvious abnormality  NECK:  supple, symmetrical, trachea midline and no carotid bruits  LUNGS:  clear to auscultation  CARDIOVASCULAR:  regular rate and rhythm and no murmur noted  ABDOMEN:  no scars, normal bowel sounds, soft, non-distended, non-tender, voluntary guarding absent, no masses palpated and hernia absent  MUSCULOSKELETAL:  0+ pitting edema lower extremities  NEUROLOGIC:  Mental Status Exam:  Level of Alertness:   awake  Orientation:   person, place, time  SKIN:  no bruising or bleeding and normal skin color, texture, turgor    Radiology Review:    Findings: Pancreas is visualized in head and neck and appears   normal in visualized portions. Diffusely increased echogenicity of   liver. Dependent multiple gallstones. Color wall thickness between   2 and 3 mm. No reported sonographic Chin sign.  Mild low level   internal echoes in the gallbladder which could represent mild   sludge.       Common duct caliber 4 mm. Right kidney 12.0 cm in length with no   hydronephrosis and no calculi. Inferior vena cava not well   visualized. IMPRESSION/RECOMMENDATIONS:    The patient has atypical symptoms for biliary colic and evidence of cholelithiasis. He is scheduled for an up coming EGD and colonoscopy. We will await the results of these studies before giving considerations for a possible laparoscopic cholecystectomy.    Navid Cabrera No

## 2023-03-12 DIAGNOSIS — J06.9 VIRAL URI WITH COUGH: ICD-10-CM

## 2023-03-13 RX ORDER — FLUTICASONE PROPIONATE 50 MCG
SPRAY, SUSPENSION (ML) NASAL
Qty: 16 G | Refills: 0 | Status: SHIPPED | OUTPATIENT
Start: 2023-03-13

## 2023-03-20 DIAGNOSIS — R39.12 BENIGN PROSTATIC HYPERPLASIA WITH WEAK URINARY STREAM: ICD-10-CM

## 2023-03-20 DIAGNOSIS — E11.9 TYPE 2 DIABETES MELLITUS WITHOUT COMPLICATION, WITHOUT LONG-TERM CURRENT USE OF INSULIN (HCC): ICD-10-CM

## 2023-03-20 DIAGNOSIS — N40.1 BENIGN PROSTATIC HYPERPLASIA WITH WEAK URINARY STREAM: ICD-10-CM

## 2023-03-20 DIAGNOSIS — I10 BENIGN ESSENTIAL HTN: ICD-10-CM

## 2023-03-20 RX ORDER — TAMSULOSIN HYDROCHLORIDE 0.4 MG/1
CAPSULE ORAL
Qty: 90 CAPSULE | Refills: 1 | Status: SHIPPED | OUTPATIENT
Start: 2023-03-20

## 2023-03-20 RX ORDER — SITAGLIPTIN AND METFORMIN HYDROCHLORIDE 500; 50 MG/1; MG/1
TABLET, FILM COATED ORAL
Qty: 82 TABLET | Refills: 0 | Status: SHIPPED | OUTPATIENT
Start: 2023-03-20

## 2023-03-20 RX ORDER — LISINOPRIL 30 MG/1
TABLET ORAL
Qty: 90 TABLET | Refills: 1 | Status: SHIPPED | OUTPATIENT
Start: 2023-03-20

## 2023-03-20 NOTE — TELEPHONE ENCOUNTER
Patient is requesting re-fills on Lisinopril 30 mg, Tamsulosin 0.4 mg and Janumet 50/500 (did not see on list?)  Last re-fill  for the first 2 was on 11/9/2022. Last visit was 11/9/2022 and next visit is 5/1/2023. Please send to  [facility]. Any questions/concerns please call  patient at number provided.

## 2023-04-26 LAB
A/G RATIO: 1.3 (CALC) (ref 1–2.5)
ALBUMIN SERPL-MCNC: 4.1 G/DL (ref 3.6–5.1)
ALP BLD-CCNC: 52 U/L (ref 35–144)
ALT SERPL-CCNC: 28 U/L (ref 9–46)
AST SERPL-CCNC: 19 U/L (ref 10–35)
BASOPHILS ABSOLUTE: 32 CELLS/UL (ref 0–200)
BASOPHILS RELATIVE PERCENT: 0.5 %
BILIRUB SERPL-MCNC: 0.5 MG/DL (ref 0.2–1.2)
BUN / CREAT RATIO: ABNORMAL (CALC) (ref 6–22)
BUN BLDV-MCNC: 12 MG/DL (ref 7–25)
CALCIUM SERPL-MCNC: 9.4 MG/DL (ref 8.6–10.3)
CHLORIDE BLD-SCNC: 104 MMOL/L (ref 98–110)
CHOLESTEROL, TOTAL: 144 MG/DL
CHOLESTEROL/HDL RATIO: 2.4 (CALC)
CO2: 27 MMOL/L (ref 20–32)
CREAT SERPL-MCNC: 0.78 MG/DL (ref 0.7–1.28)
EOSINOPHILS ABSOLUTE: 139 CELLS/UL (ref 15–500)
EOSINOPHILS RELATIVE PERCENT: 2.2 %
ESTIMATED GLOMERULAR FILTRATION RATE CREATININE EQUATION: 96 ML/MIN/1.73M2
GLOBULIN: 3.2 G/DL (CALC) (ref 1.9–3.7)
GLUCOSE BLD-MCNC: 130 MG/DL (ref 65–99)
HBA1C MFR BLD: 6.7 % OF TOTAL HGB
HCT VFR BLD CALC: 40 % (ref 38.5–50)
HDLC SERPL-MCNC: 61 MG/DL
HEMOGLOBIN: 13.3 G/DL (ref 13.2–17.1)
LDL CHOLESTEROL CALCULATED: 65 MG/DL (CALC)
LYMPHOCYTES ABSOLUTE: 1355 CELLS/UL (ref 850–3900)
LYMPHOCYTES RELATIVE PERCENT: 21.5 %
MCH RBC QN AUTO: 30.4 PG (ref 27–33)
MCHC RBC AUTO-ENTMCNC: 33.3 G/DL (ref 32–36)
MCV RBC AUTO: 91.3 FL (ref 80–100)
MONOCYTES ABSOLUTE: 617 CELLS/UL (ref 200–950)
MONOCYTES RELATIVE PERCENT: 9.8 %
NEUTROPHILS ABSOLUTE: 4158 CELLS/UL (ref 1500–7800)
NONHDLC SERPL-MCNC: 83 MG/DL (CALC)
PDW BLD-RTO: 13 % (ref 11–15)
PLATELET # BLD: 221 THOUSAND/UL (ref 140–400)
PMV BLD AUTO: 10.9 FL (ref 7.5–12.5)
POTASSIUM SERPL-SCNC: 4.3 MMOL/L (ref 3.5–5.3)
RBC # BLD: 4.38 MILLION/UL (ref 4.2–5.8)
SEGMENTED NEUTROPHILS RELATIVE PERCENT: 66 %
SODIUM BLD-SCNC: 141 MMOL/L (ref 135–146)
TOTAL PROTEIN: 7.3 G/DL (ref 6.1–8.1)
TRIGL SERPL-MCNC: 97 MG/DL
WBC # BLD: 6.3 THOUSAND/UL (ref 3.8–10.8)

## 2023-05-01 ENCOUNTER — OFFICE VISIT (OUTPATIENT)
Dept: INTERNAL MEDICINE CLINIC | Age: 73
End: 2023-05-01

## 2023-05-01 VITALS
OXYGEN SATURATION: 97 % | HEIGHT: 73 IN | BODY MASS INDEX: 34.46 KG/M2 | WEIGHT: 260 LBS | HEART RATE: 100 BPM | DIASTOLIC BLOOD PRESSURE: 72 MMHG | SYSTOLIC BLOOD PRESSURE: 134 MMHG

## 2023-05-01 DIAGNOSIS — K80.20 CALCULUS OF GALLBLADDER WITHOUT CHOLECYSTITIS WITHOUT OBSTRUCTION: ICD-10-CM

## 2023-05-01 DIAGNOSIS — L40.9 PSORIASIS: ICD-10-CM

## 2023-05-01 DIAGNOSIS — D12.6 ADENOMATOUS POLYP OF COLON, UNSPECIFIED PART OF COLON: ICD-10-CM

## 2023-05-01 DIAGNOSIS — R39.12 BENIGN PROSTATIC HYPERPLASIA WITH WEAK URINARY STREAM: ICD-10-CM

## 2023-05-01 DIAGNOSIS — K64.4 HEMORRHOIDS, EXTERNAL: ICD-10-CM

## 2023-05-01 DIAGNOSIS — E11.40 CONTROLLED TYPE 2 DIABETES WITH NEUROPATHY (HCC): ICD-10-CM

## 2023-05-01 DIAGNOSIS — I10 BENIGN ESSENTIAL HTN: Primary | ICD-10-CM

## 2023-05-01 DIAGNOSIS — R06.81 WITNESSED EPISODE OF APNEA: ICD-10-CM

## 2023-05-01 DIAGNOSIS — E78.5 HYPERLIPIDEMIA, UNSPECIFIED HYPERLIPIDEMIA TYPE: ICD-10-CM

## 2023-05-01 DIAGNOSIS — Z12.5 SCREENING FOR PROSTATE CANCER: ICD-10-CM

## 2023-05-01 DIAGNOSIS — N40.1 BENIGN PROSTATIC HYPERPLASIA WITH WEAK URINARY STREAM: ICD-10-CM

## 2023-05-01 DIAGNOSIS — R09.02 HYPOXIA: ICD-10-CM

## 2023-05-01 DIAGNOSIS — K64.8 HEMORRHOIDS, INTERNAL: ICD-10-CM

## 2023-05-01 RX ORDER — BLOOD-GLUCOSE METER
KIT MISCELLANEOUS
Qty: 200 STRIP | Refills: 3 | Status: SHIPPED | OUTPATIENT
Start: 2023-05-01 | End: 2023-05-04

## 2023-05-01 RX ORDER — BETAMETHASONE DIPROPIONATE 0.05 %
OINTMENT (GRAM) TOPICAL
Qty: 45 G | Refills: 1 | Status: SHIPPED | OUTPATIENT
Start: 2023-05-01

## 2023-05-01 SDOH — ECONOMIC STABILITY: FOOD INSECURITY: WITHIN THE PAST 12 MONTHS, YOU WORRIED THAT YOUR FOOD WOULD RUN OUT BEFORE YOU GOT MONEY TO BUY MORE.: NEVER TRUE

## 2023-05-01 SDOH — ECONOMIC STABILITY: FOOD INSECURITY: WITHIN THE PAST 12 MONTHS, THE FOOD YOU BOUGHT JUST DIDN'T LAST AND YOU DIDN'T HAVE MONEY TO GET MORE.: NEVER TRUE

## 2023-05-01 SDOH — ECONOMIC STABILITY: HOUSING INSECURITY
IN THE LAST 12 MONTHS, WAS THERE A TIME WHEN YOU DID NOT HAVE A STEADY PLACE TO SLEEP OR SLEPT IN A SHELTER (INCLUDING NOW)?: NO

## 2023-05-01 SDOH — ECONOMIC STABILITY: INCOME INSECURITY: HOW HARD IS IT FOR YOU TO PAY FOR THE VERY BASICS LIKE FOOD, HOUSING, MEDICAL CARE, AND HEATING?: NOT HARD AT ALL

## 2023-05-01 ASSESSMENT — PATIENT HEALTH QUESTIONNAIRE - PHQ9
SUM OF ALL RESPONSES TO PHQ QUESTIONS 1-9: 0
2. FEELING DOWN, DEPRESSED OR HOPELESS: 0
SUM OF ALL RESPONSES TO PHQ QUESTIONS 1-9: 0
SUM OF ALL RESPONSES TO PHQ9 QUESTIONS 1 & 2: 0
1. LITTLE INTEREST OR PLEASURE IN DOING THINGS: 0

## 2023-05-01 NOTE — PROGRESS NOTES
[facility]) Physicians  Internal Medicine  Patient Encounter  [address] [facility], D.O., [facility]         Chief Complaint   Patient presents with    Diabetes     6 month follow up         HPI: 81 y.o. male seen today for follow up regarding the status of his current chronic medical problems listed below and med reconciliation. Patient requesting handicap placard. Patient with ongoing right hip pain. Patient has several requests today that were addressed. Patient requesting refills. Patient requesting advice on hemorrhoids and low oxygen. He had colonoscopy----> Large external, mod internal hemorrhoids. He may have blood occasionally. He takes stool softener and fiber. ??names of the medication. BM's are good. If he does not take, he can be constipated. Now he goes every day. His oxygen went down when he was in recovery. Diabetes Mellitus Type II, Follow-up--   Lab Results   Component Value Date    LABA1C 6.7 (H) 04/25/2023     Unfortunately, the patient gained 9 pounds back. He denies any problems with excessive thirst or frequent urination. He denies any blurry vision or worsening dysesthesias in the feet. Last Eye Exam-- 1/19/2018. Overdue. U.Microalbumin/Cr--12/6/2019, repeat today  Pt is on ACEI-- Lisinopril  Complications-- None  Insulin Treated? No.    + ASA 81 mg daily  + Statin  Last LDL--65    HTN--Patient denies any new headaches, lightheadedness, syncope, chest pain, shortness of breath, orthopnea, swelling. He denies TIA or stroke related symptoms-- specifically unilateral weakness, paresthesias, speech or vision changes. BPH--  He denies any symptoms of slow stream, nocturia, frequency. He denies any hematuria. He is doing well with Flomax. Hyperlipidemia:   He is tolerating Simvastatin. He denies myalgias, weakness. Lab Results   Component Value Date    LDLCALC 65 04/25/2023     Gallstones-- Previous ultrasound in September 2017 showed cholelithiasis.   He

## 2023-05-03 ENCOUNTER — TELEPHONE (OUTPATIENT)
Dept: ADMINISTRATIVE | Age: 73
End: 2023-05-03

## 2023-05-03 NOTE — TELEPHONE ENCOUNTER
Submitted PA for FreeStyle Lite Test strips  [facility]: [id number]  STATUS: DENIED. LETTER ATTACHED. If this requires a response please respond to the pool. [address]). Please advise patient thank you.

## 2023-05-04 RX ORDER — LANCETS 30 GAUGE
1 EACH MISCELLANEOUS 2 TIMES DAILY
Qty: 100 EACH | Refills: 11 | Status: SHIPPED | OUTPATIENT
Start: 2023-05-04

## 2023-05-04 RX ORDER — BLOOD SUGAR DIAGNOSTIC
1 STRIP MISCELLANEOUS 2 TIMES DAILY
Qty: 100 EACH | Refills: 3 | Status: SHIPPED | OUTPATIENT
Start: 2023-05-04

## 2023-05-04 RX ORDER — BLOOD-GLUCOSE METER
1 EACH MISCELLANEOUS 2 TIMES DAILY
Qty: 1 KIT | Refills: 0 | Status: SHIPPED | OUTPATIENT
Start: 2023-05-04

## 2023-05-04 NOTE — TELEPHONE ENCOUNTER
Reviewed denial letter. Both brands that the insurance state are the preferred are not in our [address]  I will continue to try other brands     Please send in new script that is pending

## 2023-05-05 ENCOUNTER — TELEPHONE (OUTPATIENT)
Dept: INTERNAL MEDICINE CLINIC | Age: 73
End: 2023-05-05

## 2023-05-05 NOTE — TELEPHONE ENCOUNTER
[facility] is calling to see if Dr Taylor Buckley can re-fax prescriptions that were sent to pharmacy yesterday. Patient accidentally cancelled the prescriptions at [facility].        Please re-resend:   Blood Glucose Monitoring Suppl (ONETOUCH VERIO) w/Device KIT  blood glucose test strips (ONETOUCH VERIO) strip  OneTouch Delica Lancets 56E MISC    Please fax to:  [facility] [place], [phone] - F [phone]   [place] [address] [place] [address]   Phone:  [phone]  Fax:  [phone]

## 2023-05-08 ENCOUNTER — OFFICE VISIT (OUTPATIENT)
Dept: PULMONOLOGY | Age: 73
End: 2023-05-08
Payer: MEDICARE

## 2023-05-08 VITALS
SYSTOLIC BLOOD PRESSURE: 118 MMHG | OXYGEN SATURATION: 97 % | WEIGHT: 260 LBS | HEIGHT: 71 IN | DIASTOLIC BLOOD PRESSURE: 80 MMHG | HEART RATE: 77 BPM | BODY MASS INDEX: 36.4 KG/M2

## 2023-05-08 DIAGNOSIS — G47.34 NOCTURNAL HYPOXEMIA: ICD-10-CM

## 2023-05-08 DIAGNOSIS — E66.01 SEVERE OBESITY (BMI 35.0-39.9) WITH COMORBIDITY (HCC): ICD-10-CM

## 2023-05-08 DIAGNOSIS — E66.9 OBESITY (BMI 30-39.9): Primary | ICD-10-CM

## 2023-05-08 DIAGNOSIS — R06.83 SNORING: ICD-10-CM

## 2023-05-08 DIAGNOSIS — G47.10 HYPERSOMNIA: ICD-10-CM

## 2023-05-08 PROCEDURE — 3079F DIAST BP 80-89 MM HG: CPT | Performed by: STUDENT IN AN ORGANIZED HEALTH CARE EDUCATION/TRAINING PROGRAM

## 2023-05-08 PROCEDURE — 99204 OFFICE O/P NEW MOD 45 MIN: CPT | Performed by: STUDENT IN AN ORGANIZED HEALTH CARE EDUCATION/TRAINING PROGRAM

## 2023-05-08 PROCEDURE — 3074F SYST BP LT 130 MM HG: CPT | Performed by: STUDENT IN AN ORGANIZED HEALTH CARE EDUCATION/TRAINING PROGRAM

## 2023-05-08 ASSESSMENT — SLEEP AND FATIGUE QUESTIONNAIRES
NECK CIRCUMFERENCE (INCHES): 18
HOW LIKELY ARE YOU TO NOD OFF OR FALL ASLEEP WHILE WATCHING TV: 0
HOW LIKELY ARE YOU TO NOD OFF OR FALL ASLEEP WHILE SITTING INACTIVE IN A PUBLIC PLACE: 0
ESS TOTAL SCORE: 4
HOW LIKELY ARE YOU TO NOD OFF OR FALL ASLEEP WHEN YOU ARE A PASSENGER IN A CAR FOR AN HOUR WITHOUT A BREAK: 0
HOW LIKELY ARE YOU TO NOD OFF OR FALL ASLEEP WHILE SITTING AND READING: 0
HOW LIKELY ARE YOU TO NOD OFF OR FALL ASLEEP WHILE SITTING QUIETLY AFTER LUNCH WITHOUT ALCOHOL: 1
HOW LIKELY ARE YOU TO NOD OFF OR FALL ASLEEP WHILE SITTING AND TALKING TO SOMEONE: 0
HOW LIKELY ARE YOU TO NOD OFF OR FALL ASLEEP IN A CAR, WHILE STOPPED FOR A FEW MINUTES IN TRAFFIC: 0
HOW LIKELY ARE YOU TO NOD OFF OR FALL ASLEEP WHILE LYING DOWN TO REST IN THE AFTERNOON WHEN CIRCUMSTANCES PERMIT: 3

## 2023-05-08 NOTE — PROGRESS NOTES
[address]  Sleep Medicine  [address], [address] [facility]  [facility]    Chief Complaint   Patient presents with    Snoring       Wilbert Montgomery is a 81 y.o. male who comes in for sleep evaluation. His complaints include  overnight hypoxemia detected while in the hospital .     Pertinent PMHx includes: hypertension, BPH, obesity. He goes to bed at around 1-2am. He falls asleep in few minutes. He awakens at 9:30-10am.    He awakens 0-1 times per night. He does not use medication for sleep. He does take daytime naps sometimes. He describes the symptoms as snoring, waking up in the middle of the night gasping for air, and falling asleep/dosing off during idle situations. He denies morning dry mouth. He denies recent significant weight gain . He has not fallen asleep while driving. He does not have symptoms that fulfill the criteria for restless legs syndrome. Previous evaluation and treatment has included: none. Family hx of sleep disorders: not to patient's knowledge  Caffeinated drinks/day: 1 cup of coffee in the AM  Alcohol intake/day/week: none  Occupation: retired      DATA REVIEWED TODAY:  [place] & Insomnia Severity Index scores  Sleep Medicine 5/8/2023   Sitting and reading 0   Watching TV 0   Sitting, inactive in a public place (e.g. a theatre or a meeting) 0   As a passenger in a car for an hour without a break 0   Lying down to rest in the afternoon when circumstances permit 3   Sitting and talking to someone 0   Sitting quietly after a lunch without alcohol 1   In a car, while stopped for a few minutes in traffic 0   [place] Sleepiness Score 4   Neck circumference (Inches) 18     Insomnia Severity Index (COV) 5/8/2023   Difficulty falling asleep 0   Difficulty staying asleep 0   Problems waking up too early 0   How satisfied/dissatisfied are you with your CURRENT sleep pattern?  1   How NOTICEABLE to others do you think your sleep problem is in terms of impairing the quality

## 2023-05-08 NOTE — PATIENT INSTRUCTIONS
Patient information on the evaluation procedure for sleep apnea:    1. You are going to have a portable sleep study: This is a home sleep study that will be done to see if you have obstructive sleep apnea. You will have a flow monitor on your nose, belt on your chest and a oxygen/heart rate monitor on your finger. Please do not wear nail polish on day of the study as it will interfere with the oxygen reading probe that is placed on your finger during the study. Once you receive the device, you will also receive instructions on how to put it on and turn it on. After 2 nights you will return it. 2. The sleep office will call you with the results a week after the study. If the study showed that you have obstructive sleep apnea you will be told of the next step in your care. Commonly the recommended treatment is CPAP Therapy. If you agree to this therapy and you receive your CPAP before your next appointment, please bring it with you to your first follow-up appointment. Patients who have obstructive sleep apnea on the sleep study and have chosen to try CPAP:  A home equipment company will contact you to set you up with a CPAP machine and fit you for a mask. Please bring your CPAP, the mask and all supplies including the electrical cord with you to all your first follow up appointments with the sleep physician    Please keep trying to use your CPAP until you have seen in the sleep office in follow up as a lot of the problems patients have with CPAP can be addressed and corrected by your sleep provider. Sleep center contact information:  [facility]: [phone]  [facility]: [phone]             What are the risk factors for Obstructive Sleep Apnea (MALIHA)? Obesity  Snoring  Daytime sleepiness  Increasing age  Male gender  Taking sedating medications  Alcohol use  Hypertension  Stroke  Diabetes  Smoking     What is MALIHA?   People with MALIHA experience recurrent

## 2023-05-11 RX ORDER — LANCETS 30 GAUGE
1 EACH MISCELLANEOUS 2 TIMES DAILY
Qty: 100 EACH | Refills: 11 | Status: SHIPPED | OUTPATIENT
Start: 2023-05-11 | End: 2023-05-14 | Stop reason: SDUPTHER

## 2023-05-11 RX ORDER — BLOOD-GLUCOSE METER
1 EACH MISCELLANEOUS 2 TIMES DAILY
Qty: 1 KIT | Refills: 0 | Status: SHIPPED | OUTPATIENT
Start: 2023-05-11

## 2023-05-11 RX ORDER — BLOOD SUGAR DIAGNOSTIC
1 STRIP MISCELLANEOUS 2 TIMES DAILY
Qty: 100 EACH | Refills: 3 | Status: SHIPPED | OUTPATIENT
Start: 2023-05-11

## 2023-05-11 NOTE — TELEPHONE ENCOUNTER
[facility] is telling patient that they had not received the new fax that was faxed on 5/5.    Patient is calling to see if Dr. Greta Sorensen can cancel through [facility]  blood glucose test strips (ONETOUCH VERIO) strip    OneTouch Delica Lancets 94P MISC    Blood Glucose Monitoring Suppl (Vinh Nation) w/Device KIT    Please send to:   [facility] [phone] Nate Langley - OPAL [phone]    Please call patient when sent to [facility]

## 2023-05-14 RX ORDER — LANCETS 30 GAUGE
1 EACH MISCELLANEOUS 2 TIMES DAILY
Qty: 3 EACH | Refills: 3 | Status: SHIPPED | OUTPATIENT
Start: 2023-05-14

## 2023-05-18 DIAGNOSIS — J06.9 VIRAL URI WITH COUGH: ICD-10-CM

## 2023-05-18 RX ORDER — FLUTICASONE PROPIONATE 50 MCG
SPRAY, SUSPENSION (ML) NASAL
Qty: 16 G | Refills: 5 | Status: SHIPPED | OUTPATIENT
Start: 2023-05-18

## 2023-06-08 ENCOUNTER — HOSPITAL ENCOUNTER (OUTPATIENT)
Dept: SLEEP CENTER | Age: 73
Discharge: HOME | End: 2023-06-08
Payer: MEDICARE

## 2023-06-08 DIAGNOSIS — G47.34 NOCTURNAL HYPOXEMIA: ICD-10-CM

## 2023-06-08 DIAGNOSIS — R06.83 SNORING: ICD-10-CM

## 2023-06-08 DIAGNOSIS — G47.10 HYPERSOMNIA: ICD-10-CM

## 2023-06-08 DIAGNOSIS — E66.9 OBESITY (BMI 30-39.9): ICD-10-CM

## 2023-06-08 PROCEDURE — 95806 SLEEP STUDY UNATT&RESP EFFT: CPT

## 2023-06-16 ENCOUNTER — TELEPHONE (OUTPATIENT)
Dept: PULMONOLOGY | Age: 73
End: 2023-06-16

## 2023-06-16 DIAGNOSIS — G47.33 OSA (OBSTRUCTIVE SLEEP APNEA): Primary | ICD-10-CM

## 2023-06-28 DIAGNOSIS — E78.5 HYPERLIPIDEMIA, UNSPECIFIED HYPERLIPIDEMIA TYPE: ICD-10-CM

## 2023-06-28 RX ORDER — SIMVASTATIN 20 MG
TABLET ORAL
Qty: 90 TABLET | Refills: 1 | Status: SHIPPED | OUTPATIENT
Start: 2023-06-28

## 2023-07-10 ENCOUNTER — HOSPITAL ENCOUNTER (OUTPATIENT)
Dept: SLEEP CENTER | Age: 73
Discharge: HOME | End: 2023-07-10
Payer: MEDICARE

## 2023-07-10 DIAGNOSIS — G47.33 OSA (OBSTRUCTIVE SLEEP APNEA): ICD-10-CM

## 2023-07-10 PROCEDURE — 95811 POLYSOM 6/>YRS CPAP 4/> PARM: CPT

## 2023-07-11 ENCOUNTER — TELEPHONE (OUTPATIENT)
Dept: PULMONOLOGY | Age: 73
End: 2023-07-11

## 2023-07-11 NOTE — TELEPHONE ENCOUNTER
Please inform patient based on the recent titration study, we will proceed with treating her obstructive sleep apnea with auto Bilevel PAP. I will order a PAP device via a durable medical equipment company of their choice (if no preference, we will go with [address] [place] [address]) and they will reach out to keep him updated on the process. This will be the company that is going to work with his insurance and provide him the PAP device, along with replacing the mask and other supplies going forward. Please let them also know that if they are not satisfied with their service, they can change DME companies at any time. If they have any questions, they can let you know or message me via [employer]. If patient agrees with plan, please route the PAP order to DME company (order already completed on a separate order encounter). Patient should be scheduled for 60-90 days follow up.     Thanks  Nichole Rand MD

## 2023-07-11 NOTE — PROGRESS NOTES
Diagnosis: [x] MALIHA  (G47.33) [] CSA (G47.31) []  Other:__________________   Length of Need: [] 13 months [x]  99 Months                                          []  Other:__________________   Machine (PHYLLIS): [] Respironics Auto (with modem for remote monitoring)       [] Other:____________________    [x]  ResMed Auto (with modem for remote monitoring)    []  CPAP () [x] Bilevel ()   Mode: Mode:   [] Auto [] Fixed [x] Auto [] Spontaneous   Pmin:_________cmH2O      Pmax:_________cmH2O   P:_________cmH2O    EPAPmin:____10_____cmH2O IPAP:__________cmH2O     IPAPmax:_____25____cmH2O EPAP:__________cmH2O     PSmin:_______  PSmax:_______       (ResMed) PS:____6_____     Flex/EPR - 3 full time                          Ramp time: 30 min Flex/EPR - 3 full time                 Ramp time: 30 min   Ramp Pressure:___________cmH2O Ramp Pressure:____________cmH2O         Humidifier: [x] Heated ()                                               [] Passive     [x] Water chamber replacement ()/ 1 per 6 months   Mask:   [x] Nasal () /1 per 3 months [x] Full Face () /1 per 3 months   [x] Patient choice -Size and fit mask [x] Patient Choice - Size and fit mask   [] Dispense:__________________________________ [x] Dispense: airfit medium F20   [x] Headgear () / 1 per 3 months [x] Headgear () / 1 per 3 months   [x] Replacement Nasal Cushion ()/2 per month [x] Interface Replacement ()/1 per month   [x] Replacement Nasal Pillows ()/2 per month       Tubing: [x] Heated ()/1 per 3 months                           [] Standard ()/1 per 3 months  [] Other:____________________   Filters: [x] Non-disposable ()/1 per 6 months                 [x] Ultra-Fine, Disposable ()/2 per month     Miscellaneous: [] Chin Strap ()/ 1 per 6months      [] Other:__________________________________         Start Order Date: 07/11/23    MEDICAL JUSTIFICATION:  I, the undersigned, certify that

## 2023-07-12 PROBLEM — G47.33 OSA (OBSTRUCTIVE SLEEP APNEA): Status: ACTIVE | Noted: 2023-07-12

## 2023-07-12 PROCEDURE — 95811 POLYSOM 6/>YRS CPAP 4/> PARM: CPT | Performed by: STUDENT IN AN ORGANIZED HEALTH CARE EDUCATION/TRAINING PROGRAM

## 2023-07-12 NOTE — TELEPHONE ENCOUNTER
Spoke to pt regarding message below, pt agrees with physicians request. order sent and follow up appt scheduled.

## 2023-08-23 RX ORDER — LANCETS 30 GAUGE
1 EACH MISCELLANEOUS 2 TIMES DAILY
Qty: 3 EACH | Refills: 3 | Status: SHIPPED | OUTPATIENT
Start: 2023-08-23

## 2023-08-23 RX ORDER — BLOOD SUGAR DIAGNOSTIC
1 STRIP MISCELLANEOUS 2 TIMES DAILY
Qty: 100 EACH | Refills: 3 | Status: SHIPPED | OUTPATIENT
Start: 2023-08-23

## 2023-09-07 ENCOUNTER — TELEPHONE (OUTPATIENT)
Dept: PULMONOLOGY | Age: 73
End: 2023-09-07

## 2023-09-07 NOTE — TELEPHONE ENCOUNTER
Pt called in stating that he needs to move his appt on 9/12 due to him not having enough time. Pt stated that he needs before 10/1 due to insurance. Pt will call back to schedule.

## 2023-09-27 ENCOUNTER — TELEPHONE (OUTPATIENT)
Dept: PULMONOLOGY | Age: 73
End: 2023-09-27

## 2023-09-27 NOTE — TELEPHONE ENCOUNTER
Pt called in asking if Dr. Houston Orlando and [facility] providers will take his insurance. Informed pt that his insurance will be considered out of network come 10/1. Pt stated he is switching insurance but won't go in effect until the first of the year. Cancelled pt's appt for 10/9 due to being out of network and pt stated he will call back to r/s once his insurance changes.

## 2023-10-02 ENCOUNTER — OFFICE VISIT (OUTPATIENT)
Dept: PULMONOLOGY | Age: 73
End: 2023-10-02
Payer: MEDICARE

## 2023-10-02 VITALS
OXYGEN SATURATION: 95 % | HEIGHT: 71 IN | HEART RATE: 110 BPM | WEIGHT: 267 LBS | DIASTOLIC BLOOD PRESSURE: 78 MMHG | SYSTOLIC BLOOD PRESSURE: 142 MMHG | BODY MASS INDEX: 37.38 KG/M2

## 2023-10-02 DIAGNOSIS — I10 BENIGN ESSENTIAL HTN: ICD-10-CM

## 2023-10-02 DIAGNOSIS — G47.33 OSA ON CPAP: Primary | ICD-10-CM

## 2023-10-02 PROCEDURE — 3077F SYST BP >= 140 MM HG: CPT | Performed by: STUDENT IN AN ORGANIZED HEALTH CARE EDUCATION/TRAINING PROGRAM

## 2023-10-02 PROCEDURE — 3078F DIAST BP <80 MM HG: CPT | Performed by: STUDENT IN AN ORGANIZED HEALTH CARE EDUCATION/TRAINING PROGRAM

## 2023-10-02 PROCEDURE — 99214 OFFICE O/P EST MOD 30 MIN: CPT | Performed by: STUDENT IN AN ORGANIZED HEALTH CARE EDUCATION/TRAINING PROGRAM

## 2023-10-02 PROCEDURE — 1123F ACP DISCUSS/DSCN MKR DOCD: CPT | Performed by: STUDENT IN AN ORGANIZED HEALTH CARE EDUCATION/TRAINING PROGRAM

## 2023-10-02 ASSESSMENT — SLEEP AND FATIGUE QUESTIONNAIRES
HOW LIKELY ARE YOU TO NOD OFF OR FALL ASLEEP WHILE SITTING AND READING: 0
HOW LIKELY ARE YOU TO NOD OFF OR FALL ASLEEP WHILE SITTING INACTIVE IN A PUBLIC PLACE: 0
HOW LIKELY ARE YOU TO NOD OFF OR FALL ASLEEP WHILE LYING DOWN TO REST IN THE AFTERNOON WHEN CIRCUMSTANCES PERMIT: 1
HOW LIKELY ARE YOU TO NOD OFF OR FALL ASLEEP WHILE SITTING QUIETLY AFTER LUNCH WITHOUT ALCOHOL: 1
NECK CIRCUMFERENCE (INCHES): 18.5
HOW LIKELY ARE YOU TO NOD OFF OR FALL ASLEEP WHILE SITTING AND TALKING TO SOMEONE: 0
HOW LIKELY ARE YOU TO NOD OFF OR FALL ASLEEP IN A CAR, WHILE STOPPED FOR A FEW MINUTES IN TRAFFIC: 0
HOW LIKELY ARE YOU TO NOD OFF OR FALL ASLEEP WHILE WATCHING TV: 1
ESS TOTAL SCORE: 3
HOW LIKELY ARE YOU TO NOD OFF OR FALL ASLEEP WHEN YOU ARE A PASSENGER IN A CAR FOR AN HOUR WITHOUT A BREAK: 0

## 2023-10-02 NOTE — PROGRESS NOTES
[address]  Sleep Medicine  [address] [facility], [address]  [place], [address] [facility]    Chief Complaint   Patient presents with    Sleep Apnea     Follow up          Karolyn Scales comes in today for sleep apnea follow-up . He was diagnosed with severe obstructive sleep apnea and is being treated with PAP therapy. He has been on PAP therapy for about 6 weeks. He states he wears the PAP device most nights. He falls asleep in variable durations of time. He awakens a few times per night and takes naps. Symptoms of sleep apnea have improved since using PAP therapy. He is not snoring with the machine on. He denies any complaints of too high pressure, hunger for air, dry mouth, mask fit / discomfort issues, and low air humidity. He does complain of high/frequent leaks. His mask slips up during the night, causing leaks from his mouth. DME: [facility]  Mask: medium F20  Machine: ResMed Aircurve 10 VAuto      DATA REVIEWED TODAY:    Diagnostic Review  HST on 6/8/2023 showed respiratory event index of 58.5/h with oxygen desaturation down to a les of 66%.  Patient subsequently underwent in lab PAP titration test.    [place]           10/2/2023     3:04 PM 5/8/2023    11:08 AM   Sleep Medicine   Sitting and reading 0 0   Watching TV 1 0   Sitting, inactive in a public place (e.g. a theatre or a meeting) 0 0   As a passenger in a car for an hour without a break 0 0   Lying down to rest in the afternoon when circumstances permit 1 3   Sitting and talking to someone 0 0   Sitting quietly after a lunch without alcohol 1 1   In a car, while stopped for a few minutes in traffic 0 0   [place] Sleepiness Score 3 4   Neck circumference (Inches) 18.5 18       PAP Adherence (dates: 8/22/2023 - 10/1/2023)  Total days used: 34/41  % used days >= 4 hours: 80%  Average hours used per day: 6 hrs 19 mins  Mode: auto Bilevel PAP  Pressure settings: IPAP max 25 cmH2O; EPAP min 10 cmH2O; PS 6 cmH2O   Average pressures:

## 2023-10-02 NOTE — PATIENT INSTRUCTIONS
MALIHA education:    You have obstructive sleep apnea (MALIHA):    1. Obstructive sleep apnea (MALIHA) is a condition where the upper airway narrows or closes intermittently during sleep. This can lead to drops in your oxygen levels during sleep, arousals from sleep, and excessive daytime sleepiness. 2. Untreated MALIHA is associated with increased risk of hypertension, cardiac disease, myocardial infarction, stroke, and poor blood sugar control. Treating your MALIHA can decrease these risks. 3. Weight loss does improve sleep apnea. It is important to have a healthy diet and an exercise program.     4. Alcohol and sedating medicine can make MAILHA worse and should be avoided in particular before sleep. 5. If you have surgery or are hospitalized, tell your doctor that you have MALIHA and bring your CPAP to the hospital.    6. If you are drowsy or sleepy, you should not drive. If you are driving and become drowsy or sleepy, you should pull over to a safe place. Below are our drowsy driving tips. PAP machine care: You should clean your PAP regularly (see your PAP  site for more details)  Daily cleaning   1. Wipe mask with a damp towel with mild detergent, rinse with a damp towel and let air dry. You can also see CPAP cleaning wipes. Avoid harsh cleaning wipes or chemicals. 2. Humidifier- empty water daily. Fill with clean distilled water before use before sleep. Weekly Cleaning   1. Clean mask, headgear, and tubing weekly  2. Fill your sink with warm water and a few drops of mild dish soap. Swirl equipment for at least 5 minutes. Rinse well and air dry. Hang hose over something so the water droplets drip out. 3. Clean filter- rinse with warm water, squeeze excess water and blot dry with towel. If there is a white filter (respironics machine)- do not wash that - it is disposable and should be changed once a month.    4. Humidifier- Disinfect in solution that is one part vinegar and 5 parts water for 30

## 2023-10-02 NOTE — PROGRESS NOTES
Diagnosis: [x] MALIHA  (G47.33) [] CSA (G47.31) []  Other:__________________   Length of Need: [] 13 months [x]  99 Months                                          []  Other:__________________   Machine (PHYLLIS): [] Respironics Auto (with modem for remote monitoring)       [] Other:____________________    []  ResMed Auto (with modem for remote monitoring)    []  CPAP () [] Bilevel ()   Mode: Mode:   [] Auto [] Fixed [] Auto [] Spontaneous   Pmin:_________cmH2O      Pmax:_________cmH2O   P:_________cmH2O    EPAPmin:__________cmH2O IPAP:__________cmH2O     IPAPmax:__________cmH2O EPAP:__________cmH2O     PSmin:_______  PSmax:_______       (ResMed) PS:_________     Flex/EPR - 3 full time                          Ramp time: 30 min Flex/EPR - 3 full time                 Ramp time: 30 min   Ramp Pressure:___________cmH2O Ramp Pressure:____________cmH2O         Humidifier: [] Heated ()                                               [] Passive     [] Water chamber replacement ()/ 1 per 6 months   Mask:   [x] Nasal () /1 per 3 months [x] Full Face () /1 per 3 months   [x] Patient choice -Size and fit mask [x] Patient Choice - Size and fit mask   [] Dispense: [x] Dispense: alternative mask (either F20 large or Airfit F30)   [x] Headgear () / 1 per 3 months [x] Headgear () / 1 per 3 months   [x] Replacement Nasal Cushion ()/2 per month [x] Interface Replacement ()/1 per month   [x] Replacement Nasal Pillows ()/2 per month       Tubing: [] Heated ()/1 per 3 months                           [] Standard ()/1 per 3 months  [] Other:____________________   Filters: [] Non-disposable ()/1 per 6 months                 [] Ultra-Fine, Disposable ()/2 per month     Miscellaneous: [] Chin Strap ()/ 1 per 6months      [] Other:__________________________________         Start Order Date: 10/02/23    MEDICAL JUSTIFICATION:  I, the undersigned, certify that the above

## 2023-10-22 DIAGNOSIS — E11.9 TYPE 2 DIABETES MELLITUS WITHOUT COMPLICATION, WITHOUT LONG-TERM CURRENT USE OF INSULIN (HCC): ICD-10-CM

## 2023-10-22 DIAGNOSIS — I10 BENIGN ESSENTIAL HTN: ICD-10-CM

## 2023-10-22 DIAGNOSIS — R39.12 BENIGN PROSTATIC HYPERPLASIA WITH WEAK URINARY STREAM: ICD-10-CM

## 2023-10-22 DIAGNOSIS — N40.1 BENIGN PROSTATIC HYPERPLASIA WITH WEAK URINARY STREAM: ICD-10-CM

## 2023-10-23 RX ORDER — LISINOPRIL 30 MG/1
TABLET ORAL
Qty: 90 TABLET | Refills: 1 | Status: SHIPPED | OUTPATIENT
Start: 2023-10-23

## 2023-10-23 RX ORDER — TAMSULOSIN HYDROCHLORIDE 0.4 MG/1
CAPSULE ORAL
Qty: 90 CAPSULE | Refills: 1 | Status: SHIPPED | OUTPATIENT
Start: 2023-10-23

## 2023-10-23 RX ORDER — SITAGLIPTIN AND METFORMIN HYDROCHLORIDE 500; 50 MG/1; MG/1
TABLET, FILM COATED ORAL
Qty: 180 TABLET | Refills: 1 | Status: SHIPPED | OUTPATIENT
Start: 2023-10-23

## 2023-10-24 LAB
ALBUMIN SERPL-MCNC: 4.3 G/DL
ALP BLD-CCNC: 49 U/L
ALT SERPL-CCNC: 36 U/L
ANION GAP SERPL CALCULATED.3IONS-SCNC: NORMAL MMOL/L
AST SERPL-CCNC: 23 U/L
BASOPHILS ABSOLUTE: 51 /ΜL
BASOPHILS RELATIVE PERCENT: 0.9 %
BILIRUB SERPL-MCNC: 0.7 MG/DL (ref 0.1–1.4)
BUN BLDV-MCNC: 16 MG/DL
CALCIUM SERPL-MCNC: 9.8 MG/DL
CHLORIDE BLD-SCNC: 103 MMOL/L
CHOLESTEROL, TOTAL: 131 MG/DL
CHOLESTEROL/HDL RATIO: 2.3
CO2: 26 MMOL/L
CREAT SERPL-MCNC: 0.92 MG/DL
EGFR: 89
EOSINOPHILS ABSOLUTE: 120 /ΜL
EOSINOPHILS RELATIVE PERCENT: 2.1 %
GLUCOSE BLD-MCNC: 131 MG/DL
HCT VFR BLD CALC: 38.7 % (ref 41–53)
HDLC SERPL-MCNC: 56 MG/DL (ref 35–70)
HEMOGLOBIN: 12.9 G/DL (ref 13.5–17.5)
LDL CHOLESTEROL CALCULATED: 58 MG/DL (ref 0–160)
LYMPHOCYTES ABSOLUTE: 1015 /ΜL
LYMPHOCYTES RELATIVE PERCENT: 17.8 %
MCH RBC QN AUTO: 30.8 PG
MCHC RBC AUTO-ENTMCNC: 33.3 G/DL
MCV RBC AUTO: 92.4 FL
MONOCYTES ABSOLUTE: 576 /ΜL
MONOCYTES RELATIVE PERCENT: 10.1 %
NEUTROPHILS ABSOLUTE: 3939 /ΜL
NEUTROPHILS RELATIVE PERCENT: 69.7 %
NONHDLC SERPL-MCNC: 75 MG/DL
PLATELET # BLD: 187 K/ΜL
PMV BLD AUTO: 11.6 FL
POTASSIUM SERPL-SCNC: 4.4 MMOL/L
RBC # BLD: 4.19 10^6/ΜL
SODIUM BLD-SCNC: 139 MMOL/L
TOTAL PROTEIN: 7.4
TRIGL SERPL-MCNC: 86 MG/DL
TSH SERPL DL<=0.05 MIU/L-ACNC: 2.21 UIU/ML
VLDLC SERPL CALC-MCNC: NORMAL MG/DL
WBC # BLD: 5.7 10^3/ML

## 2023-10-25 LAB
A/G RATIO: 1.4 (CALC) (ref 1–2.5)
ALBUMIN SERPL-MCNC: 4.3 G/DL (ref 3.6–5.1)
ALP BLD-CCNC: 49 U/L (ref 35–144)
ALT SERPL-CCNC: 36 U/L (ref 9–46)
AST SERPL-CCNC: 23 U/L (ref 10–35)
BASOPHILS ABSOLUTE: 51 CELLS/UL (ref 0–200)
BASOPHILS RELATIVE PERCENT: 0.9 %
BILIRUB SERPL-MCNC: 0.7 MG/DL (ref 0.2–1.2)
BUN / CREAT RATIO: ABNORMAL (CALC) (ref 6–22)
BUN BLDV-MCNC: 16 MG/DL (ref 7–25)
CALCIUM SERPL-MCNC: 9.8 MG/DL (ref 8.6–10.3)
CHLORIDE BLD-SCNC: 103 MMOL/L (ref 98–110)
CO2: 26 MMOL/L (ref 20–32)
CREAT SERPL-MCNC: 0.92 MG/DL (ref 0.7–1.28)
EOSINOPHILS ABSOLUTE: 120 CELLS/UL (ref 15–500)
EOSINOPHILS RELATIVE PERCENT: 2.1 %
ESTIMATED GLOMERULAR FILTRATION RATE CREATININE EQUATION: 89 ML/MIN/1.73M2
GLOBULIN: 3.1 G/DL (CALC) (ref 1.9–3.7)
GLUCOSE BLD-MCNC: 131 MG/DL (ref 65–99)
HBA1C MFR BLD: 6.4 % OF TOTAL HGB
HCT VFR BLD CALC: 38.7 % (ref 38.5–50)
HEMOGLOBIN: 12.9 G/DL (ref 13.2–17.1)
LYMPHOCYTES ABSOLUTE: 1015 CELLS/UL (ref 850–3900)
LYMPHOCYTES RELATIVE PERCENT: 17.8 %
MCH RBC QN AUTO: 30.8 PG (ref 27–33)
MCHC RBC AUTO-ENTMCNC: 33.3 G/DL (ref 32–36)
MCV RBC AUTO: 92.4 FL (ref 80–100)
MONOCYTES ABSOLUTE: 576 CELLS/UL (ref 200–950)
MONOCYTES RELATIVE PERCENT: 10.1 %
NEUTROPHILS ABSOLUTE: 3939 CELLS/UL (ref 1500–7800)
NONHDLC SERPL-MCNC: 75 MG/DL (CALC)
PDW BLD-RTO: 12.7 % (ref 11–15)
PLATELET # BLD: 187 THOUSAND/UL (ref 140–400)
PMV BLD AUTO: 11.6 FL (ref 7.5–12.5)
POTASSIUM SERPL-SCNC: 4.4 MMOL/L (ref 3.5–5.3)
PROSTATE SPECIFIC ANTIGEN: 2.02 NG/ML
RBC # BLD: 4.19 MILLION/UL (ref 4.2–5.8)
SEGMENTED NEUTROPHILS RELATIVE PERCENT: 69.1 %
SODIUM BLD-SCNC: 139 MMOL/L (ref 135–146)
TOTAL PROTEIN: 7.4 G/DL (ref 6.1–8.1)
TSH ULTRASENSITIVE: 2.21 MIU/L (ref 0.4–4.5)
WBC # BLD: 5.7 THOUSAND/UL (ref 3.8–10.8)

## 2023-10-31 ENCOUNTER — OFFICE VISIT (OUTPATIENT)
Dept: INTERNAL MEDICINE CLINIC | Age: 73
End: 2023-10-31

## 2023-10-31 VITALS
RESPIRATION RATE: 14 BRPM | WEIGHT: 272 LBS | SYSTOLIC BLOOD PRESSURE: 114 MMHG | HEIGHT: 73 IN | BODY MASS INDEX: 36.05 KG/M2 | HEART RATE: 74 BPM | OXYGEN SATURATION: 96 % | DIASTOLIC BLOOD PRESSURE: 70 MMHG

## 2023-10-31 DIAGNOSIS — H26.9 CATARACT OF LEFT EYE, UNSPECIFIED CATARACT TYPE: ICD-10-CM

## 2023-10-31 DIAGNOSIS — L40.9 PSORIASIS: ICD-10-CM

## 2023-10-31 DIAGNOSIS — E78.5 HYPERLIPIDEMIA, UNSPECIFIED HYPERLIPIDEMIA TYPE: ICD-10-CM

## 2023-10-31 DIAGNOSIS — R60.0 BILATERAL LEG EDEMA: ICD-10-CM

## 2023-10-31 DIAGNOSIS — I10 BENIGN ESSENTIAL HTN: ICD-10-CM

## 2023-10-31 DIAGNOSIS — I87.2 VENOUS INSUFFICIENCY OF BOTH LOWER EXTREMITIES: ICD-10-CM

## 2023-10-31 DIAGNOSIS — Z01.818 PREOP EXAM FOR INTERNAL MEDICINE: ICD-10-CM

## 2023-10-31 DIAGNOSIS — D64.9 NORMOCYTIC ANEMIA: ICD-10-CM

## 2023-10-31 DIAGNOSIS — E11.9 TYPE 2 DIABETES MELLITUS WITHOUT COMPLICATION, WITHOUT LONG-TERM CURRENT USE OF INSULIN (HCC): Primary | ICD-10-CM

## 2023-10-31 LAB
CREAT UR-MCNC: 124.7 MG/DL (ref 39–259)
MICROALBUMIN UR DL<=1MG/L-MCNC: 2.4 MG/DL
MICROALBUMIN/CREAT UR: 19.2 MG/G (ref 0–30)

## 2023-10-31 RX ORDER — BETAMETHASONE DIPROPIONATE 0.05 %
OINTMENT (GRAM) TOPICAL
Qty: 45 G | Refills: 1 | Status: SHIPPED | OUTPATIENT
Start: 2023-10-31

## 2023-10-31 NOTE — PROGRESS NOTES
[facility]) Physicians  Internal Medicine  Patient Encounter  Rosa Jackson D.O., [facility]          Chief Complaint   Patient presents with    Pre-op Exam     Left eye cataract, 11/9/2023, [facility], Dr. Cesar Stephen, Fax - [phone]       HPI-- 72 y.o. male presents today for a preoperative physical.  Pt diagnosed with left eye cataract. He had his right eye cataract removed in 2021 and did very well. He continues to have difficulty with visual acuity out of the left eye. Patient is now scheduled for cataract removal with IOL implant. I have been asked to see patient for pre-operative risk assessment and clearance. Surgery scheduled for 11/9/2023 by Dr. Cesar Stephen at [facility]. Patient has multiple other medical problems including but not limb to type 2 diabetes, hypertension, hyperlipidemia, arthritis, obesity. He offers no complaints of fever, chills, sweats, cough, chest pain, shortness of breath, abdominal pain, nausea, vomiting, diarrhea. He denies orthopnea  He is C/O swelling in both legs. Swelling worsens as the day progressed and is better by AM.  .       Reviewed lab from 10/24/2023    He refuses Flu Vaccine and COVID-19 vaccine    Medical/Surgical Histories     Past Medical History:   Diagnosis Date    BPH     Cataracts, bilateral     Cholelithiasis     H. pylori infection 10/2017    Dr. Hamlet Curran    Herpes     Hyperlipidemia     Hypertension     Obesity (BMI 30-39.9) 08/18/2017    Primary osteoarthritis of right hip 07/17/2019    Psoriasis     Type II or unspecified type diabetes mellitus without mention of complication, not stated as uncontrolled       No prior H/O DVT, PE or bleeding dyscrasias    Past Surgical History:   Procedure Laterality Date    COLONOSCOPY  2006    COLONOSCOPY  10/16/2017    Dr. Santa Solorzano  08/06/2019    Dr. Jayla Santana  10/16/2017    Dr. Hamlet Curran      No reported problems with

## 2023-10-31 NOTE — TELEPHONE ENCOUNTER
----- Message from Katheryn Pacheco sent at 10/31/2023 11:16 AM EDT -----  Subject: Medication Problem    Medication: betamethasone dipropionate 0.05 % ointment  Dosage: ointment  Ordering Provider: Karan Dahl    Question/Problem: Patient missed appointment today but still needs the   ointment refill. Please advise.       Pharmacy: [facility] [address] [facility], [place], [address] [place] [address] Mihai Pastor [phone]    ---------------------------------------------------------------------------  --------------  Cinthya Parker INFO  [phone]; OK to leave message on voicemail  ---------------------------------------------------------------------------  --------------    SCRIPT ANSWERS  Relationship to Patient: Self

## 2023-11-02 RX ORDER — BETAMETHASONE DIPROPIONATE 0.5 MG/G
OINTMENT TOPICAL
Qty: 45 G | Refills: 1 | OUTPATIENT
Start: 2023-11-02

## 2023-11-28 ENCOUNTER — TELEPHONE (OUTPATIENT)
Dept: INTERNAL MEDICINE CLINIC | Age: 73
End: 2023-11-28

## 2023-11-28 DIAGNOSIS — E11.9 TYPE 2 DIABETES MELLITUS WITHOUT COMPLICATION, WITHOUT LONG-TERM CURRENT USE OF INSULIN (HCC): ICD-10-CM

## 2023-11-28 RX ORDER — SITAGLIPTIN AND METFORMIN HYDROCHLORIDE 500; 50 MG/1; MG/1
TABLET, FILM COATED ORAL
Qty: 56 TABLET | Refills: 0 | Status: SHIPPED | COMMUNITY
Start: 2023-11-28

## 2023-11-28 NOTE — TELEPHONE ENCOUNTER
----- Message from Prabhjot Senior sent at 11/28/2023 12:42 PM EST -----  Subject: Message to Provider    QUESTIONS  Information for Provider? Pt called wanting to discuss a medication which   he has spoke with provider with not too long ago. Medication the provider   may have or may not have. ..it was difficult to understand, but sounded   like [place]? Please call patient to discuss. ---------------------------------------------------------------------------  --------------  Fausto [facility]  [phone]; OK to leave message on voicemail  ---------------------------------------------------------------------------  --------------  SCRIPT ANSWERS  Relationship to Patient?  Self

## 2023-12-01 DIAGNOSIS — L40.9 PSORIASIS: ICD-10-CM

## 2023-12-04 RX ORDER — BETAMETHASONE DIPROPIONATE 0.05 %
OINTMENT (GRAM) TOPICAL
Qty: 75 G | Refills: 1 | Status: SHIPPED | OUTPATIENT
Start: 2023-12-04

## 2023-12-05 LAB
BASOPHILS ABSOLUTE: 48 /ΜL
BASOPHILS RELATIVE PERCENT: 0.7 %
EOSINOPHILS ABSOLUTE: 152 /ΜL
EOSINOPHILS RELATIVE PERCENT: 2.2 %
HCT VFR BLD CALC: 41.3 % (ref 41–53)
HEMOGLOBIN: 13.6 G/DL (ref 13.5–17.5)
LYMPHOCYTES ABSOLUTE: 1580 /ΜL
LYMPHOCYTES RELATIVE PERCENT: 22.9 %
MCH RBC QN AUTO: 32.9 PG
MCHC RBC AUTO-ENTMCNC: 12.6 G/DL
MCV RBC AUTO: 30.2 FL
MONOCYTES ABSOLUTE: 662 /ΜL
MONOCYTES RELATIVE PERCENT: 9.6 %
NEUTROPHILS ABSOLUTE: 4457 /ΜL
NEUTROPHILS RELATIVE PERCENT: 64.6 %
PLATELET # BLD: 222 K/ΜL
PMV BLD AUTO: 11.2 FL
RBC # BLD: 4.5 10^6/ΜL
WBC # BLD: 6.9 10^3/ML

## 2023-12-06 LAB
BASOPHILS ABSOLUTE: 48 CELLS/UL (ref 0–200)
BASOPHILS RELATIVE PERCENT: 0.7 %
EOSINOPHILS ABSOLUTE: 152 CELLS/UL (ref 15–500)
EOSINOPHILS RELATIVE PERCENT: 2.2 %
HCT VFR BLD CALC: 41.3 % (ref 38.5–50)
HEMOGLOBIN: 13.6 G/DL (ref 13.2–17.1)
LYMPHOCYTES ABSOLUTE: 1580 CELLS/UL (ref 850–3900)
LYMPHOCYTES RELATIVE PERCENT: 22.9 %
MCH RBC QN AUTO: 30.2 PG (ref 27–33)
MCHC RBC AUTO-ENTMCNC: 32.9 G/DL (ref 32–36)
MCV RBC AUTO: 91.8 FL (ref 80–100)
MONOCYTES ABSOLUTE: 662 CELLS/UL (ref 200–950)
MONOCYTES RELATIVE PERCENT: 9.6 %
NEUTROPHILS ABSOLUTE: 4457 CELLS/UL (ref 1500–7800)
PDW BLD-RTO: 12.6 % (ref 11–15)
PLATELET # BLD: 222 THOUSAND/UL (ref 140–400)
PMV BLD AUTO: 11.2 FL (ref 7.5–12.5)
RBC # BLD: 4.5 MILLION/UL (ref 4.2–5.8)
SEGMENTED NEUTROPHILS RELATIVE PERCENT: 64.6 %
WBC # BLD: 6.9 THOUSAND/UL (ref 3.8–10.8)

## 2023-12-22 NOTE — TELEPHONE ENCOUNTER
Lina Da Silva, patient's daughter, called, left message with a couple of questions.      Said hosp discontinued [facility] and [facility] needs a new script for this.  Her mother is on a soft/moist diet as ordered by you, but the speech therapist is saying that it should be under supervision.  Right now, she's in her room due to Covid in the building.   Neither Lina nor her sister can be there for every meal and it's impossible to hire someone for 10 minutes, twice a day.  Could you order supervision?       Patient is requesting a refill please be called in for the following medications:    1.    lisinopril (PRINIVIL;ZESTRIL) 20 MG tablet TAKE ONE TABLET BY MOUTH DAILY     2.    sitaGLIPtan-metformin (JANUMET)  MG per tablet TAKE ONE-HALF TABLET BY MOUTH TWICE A DAY   HOWEVER,  PATIENT IS REQUESTING THIS PLEASE BE PRESCRIBED  MG TWICE A DAY. He doesn't like to cut in half. Patient is requesting to speak with Moises Valera regarding this, and can be reached @ phone # provided.       [address], [facility] Adriano UFredrick 58.

## 2023-12-26 DIAGNOSIS — E78.5 HYPERLIPIDEMIA, UNSPECIFIED HYPERLIPIDEMIA TYPE: ICD-10-CM

## 2023-12-27 RX ORDER — SIMVASTATIN 20 MG
TABLET ORAL
Qty: 90 TABLET | Refills: 1 | Status: SHIPPED | OUTPATIENT
Start: 2023-12-27

## 2023-12-27 NOTE — TELEPHONE ENCOUNTER
Last appointment: 10/31/2023  Next appointment: 5/6/2024  Last refill: 6/28/23
no discharge, no irritation, no pain, no redness, and no visual changes.

## 2024-03-11 ENCOUNTER — OFFICE VISIT (OUTPATIENT)
Dept: PULMONOLOGY | Age: 74
End: 2024-03-11
Payer: MEDICARE

## 2024-03-11 VITALS
SYSTOLIC BLOOD PRESSURE: 140 MMHG | OXYGEN SATURATION: 98 % | HEIGHT: 73 IN | HEART RATE: 65 BPM | BODY MASS INDEX: 36.45 KG/M2 | WEIGHT: 275 LBS | DIASTOLIC BLOOD PRESSURE: 70 MMHG

## 2024-03-11 DIAGNOSIS — I10 BENIGN ESSENTIAL HTN: ICD-10-CM

## 2024-03-11 DIAGNOSIS — G47.33 OSA ON CPAP: Primary | ICD-10-CM

## 2024-03-11 DIAGNOSIS — E66.01 SEVERE OBESITY (BMI 35.0-39.9) WITH COMORBIDITY (HCC): ICD-10-CM

## 2024-03-11 PROCEDURE — 3078F DIAST BP <80 MM HG: CPT | Performed by: STUDENT IN AN ORGANIZED HEALTH CARE EDUCATION/TRAINING PROGRAM

## 2024-03-11 PROCEDURE — 99214 OFFICE O/P EST MOD 30 MIN: CPT | Performed by: STUDENT IN AN ORGANIZED HEALTH CARE EDUCATION/TRAINING PROGRAM

## 2024-03-11 PROCEDURE — 3077F SYST BP >= 140 MM HG: CPT | Performed by: STUDENT IN AN ORGANIZED HEALTH CARE EDUCATION/TRAINING PROGRAM

## 2024-03-11 PROCEDURE — 1123F ACP DISCUSS/DSCN MKR DOCD: CPT | Performed by: STUDENT IN AN ORGANIZED HEALTH CARE EDUCATION/TRAINING PROGRAM

## 2024-03-11 ASSESSMENT — SLEEP AND FATIGUE QUESTIONNAIRES
HOW LIKELY ARE YOU TO NOD OFF OR FALL ASLEEP WHILE LYING DOWN TO REST IN THE AFTERNOON WHEN CIRCUMSTANCES PERMIT: 0
HOW LIKELY ARE YOU TO NOD OFF OR FALL ASLEEP WHILE WATCHING TV: 1
HOW LIKELY ARE YOU TO NOD OFF OR FALL ASLEEP WHILE SITTING INACTIVE IN A PUBLIC PLACE: 0
ESS TOTAL SCORE: 1
HOW LIKELY ARE YOU TO NOD OFF OR FALL ASLEEP WHILE SITTING AND READING: 0
HOW LIKELY ARE YOU TO NOD OFF OR FALL ASLEEP IN A CAR, WHILE STOPPED FOR A FEW MINUTES IN TRAFFIC: 0
HOW LIKELY ARE YOU TO NOD OFF OR FALL ASLEEP WHILE SITTING QUIETLY AFTER LUNCH WITHOUT ALCOHOL: 0
HOW LIKELY ARE YOU TO NOD OFF OR FALL ASLEEP WHEN YOU ARE A PASSENGER IN A CAR FOR AN HOUR WITHOUT A BREAK: 0
HOW LIKELY ARE YOU TO NOD OFF OR FALL ASLEEP WHILE SITTING AND TALKING TO SOMEONE: 0

## 2024-03-11 NOTE — PROGRESS NOTES
[facility]  Sleep Medicine  [address], [address]  [place] [address]    Chief Complaint   Patient presents with    Sleep Apnea         Tim Mcdonnell comes in today for sleep apnea follow-up . He was diagnosed with severe obstructive sleep apnea and is being treated with PAP therapy.   He has been on PAP therapy for several months.  He states he wears the PAP device most nights.  At previous visit he was complaining of mask fitting issues.  Today he reports issues have improved a little since getting the right mask.     He falls asleep in variable durations of time.  He awakens a few times per night and takes naps. He does not use sleep aid/s. Symptoms of sleep apnea have improved since using PAP therapy.  He is not snoring with the machine on.  He does complain of mask fit / discomfort issues.   DME: [facility]  Mask: medium F20  Machine: ResMed Aircurve 10 VAuto        DATA REVIEWED TODAY:     Diagnostic Review  HST on 6/8/2023 showed respiratory event index of 58.5/h with oxygen desaturation down to a les of 66%. Patient subsequently underwent in lab PAP titration test.    [place]           3/11/2024    11:16 AM 10/2/2023     3:04 PM 5/8/2023    11:08 AM   Sleep Medicine   Sitting and reading 0 0 0   Watching TV 1 1 0   Sitting, inactive in a public place (e.g. a theatre or a meeting) 0 0 0   As a passenger in a car for an hour without a break 0 0 0   Lying down to rest in the afternoon when circumstances permit 0 1 3   Sitting and talking to someone 0 0 0   Sitting quietly after a lunch without alcohol 0 1 1   In a car, while stopped for a few minutes in traffic 0 0 0   [place] Sleepiness Score 1 3 4   Neck circumference (Inches)  18.5 18       PAP Adherence (dates: 12/5/2023 - 3/3/2024)  Total days used: 75/90  % used days >= 4 hours: 83%  Average hours used per day: 7 hrs 51 mins  Mode: auto Bilevel PAP  Pressure settings: IPAP max 25 cmH2O; EPAP min 10 cmH2O; PS 6 cmH2O

## 2024-03-11 NOTE — PATIENT INSTRUCTIONS
MALIHA education:    You have obstructive sleep apnea (MALIHA):    1. Obstructive sleep apnea (MALIHA) is a condition where the upper airway narrows or closes intermittently during sleep. This can lead to drops in your oxygen levels during sleep, arousals from sleep, and excessive daytime sleepiness.     2. Untreated MALIHA is associated with increased risk of hypertension, cardiac disease, myocardial infarction, stroke, and poor blood sugar control. Treating your MALIHA can decrease these risks.    3. Weight loss does improve sleep apnea. It is important to have a healthy diet and an exercise program.     4. Alcohol and sedating medicine can make MALIHA worse and should be avoided in particular before sleep.    5. If you have surgery or are hospitalized, tell your doctor that you have MALIHA and bring your CPAP to the hospital.    6. If you are drowsy or sleepy, you should not drive. If you are driving and become drowsy or sleepy, you should pull over to a safe place. Below are our drowsy driving tips.     PAP machine care:   You should clean your PAP regularly (see your PAP  site for more details)  Daily cleaning   1. Wipe mask with a damp towel with mild detergent, rinse with a damp towel and let air dry. You can also see CPAP cleaning wipes. Avoid harsh cleaning wipes or chemicals.    2. Humidifier- empty water daily. Fill with clean distilled water before use before sleep.    Weekly Cleaning   1. Clean mask, headgear, and tubing weekly  2. Fill your sink with warm water and a few drops of mild dish soap. Swirl equipment for at least 5 minutes. Rinse well and air dry. Hang hose over something so the water droplets drip out.   3. Clean filter- rinse with warm water, squeeze excess water and blot dry with towel. If there is a white filter (respironics machine)- do not wash that - it is disposable and should be changed once a month.   4. Humidifier- Disinfect in solution that is one part vinegar and 5 parts water for 30

## 2024-03-11 NOTE — PROGRESS NOTES
medically necessary for this patient’s wellbeing.  In my opinion, the supplies are both reasonable and necessary in reference to accepted standards of medicalpractice in treatment of this patient’s condition.    Demario Jackson MD    NPI: [id number]       Order Signed Date: 03/11/24      Tim Mcdonnell  1950  [address] [address]  [phone] (home)   [phone] (mobile)    Insurance:   Active Insurance as of 3/11/2024       Primary Coverage       Payor Plan Insurance Group Employer/Plan Group    [facility] MEDICARE ANTHEM DUAL ADVANTAGE [id number]       Payor Plan Address Payor Plan Phone Number Payor Plan Fax Number Effective Dates       1/1/2024 - None Entered      Subscriber Name Subscriber Birth Date Member ID       TIM MCDONNELL 1950 [id number]                      Electronically signed by Demario Jackson MD on 3/11/2024 at 11:48 AM.

## 2024-03-20 ENCOUNTER — TELEPHONE (OUTPATIENT)
Dept: INTERNAL MEDICINE CLINIC | Age: 74
End: 2024-03-20

## 2024-03-20 RX ORDER — BLOOD SUGAR DIAGNOSTIC
1 STRIP MISCELLANEOUS 2 TIMES DAILY
Qty: 200 EACH | Refills: 3 | Status: SHIPPED | OUTPATIENT
Start: 2024-03-20

## 2024-03-20 NOTE — TELEPHONE ENCOUNTER
Patient is calling in for  in regards to medication request for :    blood glucose test strips (ONETOUCH VERIO) strip   Last appointment: 10/31/2023  Next appointment: 5/6/2024  Last refill: 8/23/23      Patient would like request to go to :    [facility] - [place], [place] - [address] - P [phone] - F [phone]  [address], [facility] [address]  Phone: [phone]  Fax: [phone]

## 2024-04-30 LAB
ALBUMIN SERPL-MCNC: 4.3 G/DL
ALP BLD-CCNC: 47 U/L
ALT SERPL-CCNC: 28 U/L
ANION GAP SERPL CALCULATED.3IONS-SCNC: NORMAL MMOL/L
AST SERPL-CCNC: 20 U/L
BILIRUB SERPL-MCNC: 0.6 MG/DL (ref 0.1–1.4)
BUN BLDV-MCNC: 18 MG/DL
CALCIUM SERPL-MCNC: 9.7 MG/DL
CHLORIDE BLD-SCNC: 104 MMOL/L
CO2: 25 MMOL/L
CREAT SERPL-MCNC: 0.83 MG/DL
EGFR: NORMAL
GLUCOSE BLD-MCNC: 137 MG/DL
POTASSIUM SERPL-SCNC: 4.3 MMOL/L
SODIUM BLD-SCNC: 138 MMOL/L
TOTAL PROTEIN: 7.4

## 2024-05-01 LAB
BASOPHILS ABSOLUTE: 38 /ΜL
BASOPHILS RELATIVE PERCENT: 0.6 %
CHOLESTEROL, TOTAL: 137 MG/DL
CHOLESTEROL/HDL RATIO: 2.4
EOSINOPHILS ABSOLUTE: 147 /ΜL
EOSINOPHILS RELATIVE PERCENT: 2.3 %
ESTIMATED AVERAGE GLUCOSE: NORMAL
HBA1C MFR BLD: 6.9 %
HCT VFR BLD CALC: 39.5 % (ref 41–53)
HDLC SERPL-MCNC: 57 MG/DL (ref 35–70)
HEMOGLOBIN: 12.7 G/DL (ref 13.5–17.5)
LDL CHOLESTEROL CALCULATED: 61 MG/DL (ref 0–160)
LYMPHOCYTES ABSOLUTE: 1229 /ΜL
LYMPHOCYTES RELATIVE PERCENT: 19.2 %
MCH RBC QN AUTO: 29.9 PG
MCHC RBC AUTO-ENTMCNC: 32.2 G/DL
MCV RBC AUTO: 92.9 FL
MONOCYTES ABSOLUTE: 621 /ΜL
MONOCYTES RELATIVE PERCENT: 9.7 %
NEUTROPHILS ABSOLUTE: 4365 /ΜL
NEUTROPHILS RELATIVE PERCENT: 68.2 %
NONHDLC SERPL-MCNC: 80 MG/DL
PLATELET # BLD: 229 K/ΜL
PMV BLD AUTO: 10.8 FL
RBC # BLD: 4.25 10^6/ΜL
TRIGL SERPL-MCNC: 105 MG/DL
VLDLC SERPL CALC-MCNC: NORMAL MG/DL
WBC # BLD: 6.4 10^3/ML

## 2024-05-06 ENCOUNTER — OFFICE VISIT (OUTPATIENT)
Dept: INTERNAL MEDICINE CLINIC | Age: 74
End: 2024-05-06
Payer: MEDICARE

## 2024-05-06 ENCOUNTER — TELEPHONE (OUTPATIENT)
Dept: INTERNAL MEDICINE CLINIC | Age: 74
End: 2024-05-06

## 2024-05-06 VITALS
WEIGHT: 266 LBS | DIASTOLIC BLOOD PRESSURE: 78 MMHG | HEART RATE: 77 BPM | BODY MASS INDEX: 35.09 KG/M2 | TEMPERATURE: 98.1 F | OXYGEN SATURATION: 99 % | SYSTOLIC BLOOD PRESSURE: 124 MMHG

## 2024-05-06 DIAGNOSIS — N40.1 BENIGN PROSTATIC HYPERPLASIA WITH WEAK URINARY STREAM: ICD-10-CM

## 2024-05-06 DIAGNOSIS — I10 BENIGN ESSENTIAL HTN: ICD-10-CM

## 2024-05-06 DIAGNOSIS — R60.0 BILATERAL LEG EDEMA: ICD-10-CM

## 2024-05-06 DIAGNOSIS — E78.5 HYPERLIPIDEMIA, UNSPECIFIED HYPERLIPIDEMIA TYPE: ICD-10-CM

## 2024-05-06 DIAGNOSIS — L40.9 PSORIASIS: ICD-10-CM

## 2024-05-06 DIAGNOSIS — B37.2 CANDIDAL INTERTRIGO: ICD-10-CM

## 2024-05-06 DIAGNOSIS — K80.20 CALCULUS OF GALLBLADDER WITHOUT CHOLECYSTITIS WITHOUT OBSTRUCTION: ICD-10-CM

## 2024-05-06 DIAGNOSIS — E11.40 CONTROLLED TYPE 2 DIABETES WITH NEUROPATHY (HCC): Primary | ICD-10-CM

## 2024-05-06 DIAGNOSIS — R39.12 BENIGN PROSTATIC HYPERPLASIA WITH WEAK URINARY STREAM: ICD-10-CM

## 2024-05-06 DIAGNOSIS — I87.2 VENOUS INSUFFICIENCY OF BOTH LOWER EXTREMITIES: ICD-10-CM

## 2024-05-06 PROCEDURE — 3078F DIAST BP <80 MM HG: CPT | Performed by: INTERNAL MEDICINE

## 2024-05-06 PROCEDURE — 1123F ACP DISCUSS/DSCN MKR DOCD: CPT | Performed by: INTERNAL MEDICINE

## 2024-05-06 PROCEDURE — 3074F SYST BP LT 130 MM HG: CPT | Performed by: INTERNAL MEDICINE

## 2024-05-06 PROCEDURE — 99214 OFFICE O/P EST MOD 30 MIN: CPT | Performed by: INTERNAL MEDICINE

## 2024-05-06 RX ORDER — TAMSULOSIN HYDROCHLORIDE 0.4 MG/1
CAPSULE ORAL
Qty: 90 CAPSULE | Refills: 3 | Status: SHIPPED | OUTPATIENT
Start: 2024-05-06

## 2024-05-06 RX ORDER — SITAGLIPTIN AND METFORMIN HYDROCHLORIDE 500; 50 MG/1; MG/1
TABLET, FILM COATED ORAL
Qty: 180 TABLET | Refills: 3 | Status: SHIPPED | OUTPATIENT
Start: 2024-05-06

## 2024-05-06 RX ORDER — BETAMETHASONE DIPROPIONATE 0.05 %
OINTMENT (GRAM) TOPICAL
Qty: 75 G | Refills: 1 | Status: SHIPPED | OUTPATIENT
Start: 2024-05-06 | End: 2024-05-06 | Stop reason: SDUPTHER

## 2024-05-06 RX ORDER — ASPIRIN 81 MG/1
81 TABLET ORAL DAILY
Qty: 90 TABLET | Refills: 3 | Status: SHIPPED | OUTPATIENT
Start: 2024-05-06

## 2024-05-06 RX ORDER — BETAMETHASONE DIPROPIONATE 0.05 %
OINTMENT (GRAM) TOPICAL
Qty: 75 G | Refills: 1 | Status: SHIPPED | OUTPATIENT
Start: 2024-05-06

## 2024-05-06 RX ORDER — LISINOPRIL 30 MG/1
TABLET ORAL
Qty: 90 TABLET | Refills: 3 | Status: SHIPPED | OUTPATIENT
Start: 2024-05-06

## 2024-05-06 RX ORDER — SIMVASTATIN 20 MG
20 TABLET ORAL EVERY MORNING
Qty: 90 TABLET | Refills: 3 | Status: SHIPPED | OUTPATIENT
Start: 2024-05-06

## 2024-05-06 RX ORDER — PRENATAL VIT 91/IRON/FOLIC/DHA 28-975-200
COMBINATION PACKAGE (EA) ORAL
Qty: 42 G | Refills: 2 | Status: SHIPPED | OUTPATIENT
Start: 2024-05-06

## 2024-05-06 RX ORDER — BETAMETHASONE DIPROPIONATE 0.05 %
OINTMENT (GRAM) TOPICAL
Qty: 75 G | Refills: 1 | Status: SHIPPED | OUTPATIENT
Start: 2024-05-06 | End: 2024-05-06 | Stop reason: CLARIF

## 2024-05-06 SDOH — ECONOMIC STABILITY: FOOD INSECURITY: WITHIN THE PAST 12 MONTHS, THE FOOD YOU BOUGHT JUST DIDN'T LAST AND YOU DIDN'T HAVE MONEY TO GET MORE.: NEVER TRUE

## 2024-05-06 SDOH — ECONOMIC STABILITY: FOOD INSECURITY: WITHIN THE PAST 12 MONTHS, YOU WORRIED THAT YOUR FOOD WOULD RUN OUT BEFORE YOU GOT MONEY TO BUY MORE.: NEVER TRUE

## 2024-05-06 SDOH — ECONOMIC STABILITY: INCOME INSECURITY: HOW HARD IS IT FOR YOU TO PAY FOR THE VERY BASICS LIKE FOOD, HOUSING, MEDICAL CARE, AND HEATING?: NOT HARD AT ALL

## 2024-05-06 ASSESSMENT — PATIENT HEALTH QUESTIONNAIRE - PHQ9
SUM OF ALL RESPONSES TO PHQ QUESTIONS 1-9: 0
SUM OF ALL RESPONSES TO PHQ QUESTIONS 1-9: 0
SUM OF ALL RESPONSES TO PHQ9 QUESTIONS 1 & 2: 0
1. LITTLE INTEREST OR PLEASURE IN DOING THINGS: NOT AT ALL
SUM OF ALL RESPONSES TO PHQ QUESTIONS 1-9: 0
2. FEELING DOWN, DEPRESSED OR HOPELESS: NOT AT ALL
SUM OF ALL RESPONSES TO PHQ QUESTIONS 1-9: 0

## 2024-05-06 NOTE — PROGRESS NOTES
from Last 3 Encounters:   05/06/24 124/78   03/11/24 (!) 140/70   10/31/23 114/70          GEN: NAD, A&O, Non-toxic, Obese  HEENT: NC/AT, CHAD, EOMI.  Anicteric.  Oral cavity clear.  TMs normal.  NECK: Supple.  No thyromegaly.  No JVD  LYMPH: No C/SC nodes.  CV: Regular rhythm.  Rate normal.  No murmur.  No ectopy.  VASC: No carotid bruits, Pedal pulses symmetrical  PULM: CTA  GI: Abdomen is soft, nontender, nondistended.  No masses.  No hepatomegaly  EXT:No edema noted today.    NEURO: Diminished monofilament sensation particularly in the right foot>Left foot.  Decreased vibratory sensation both feet.  MS: No synovitis.  No joint effusions.  SKIN: Rashes.  Feet normal color and temperature, no large calluses, ulcers or wounds.  Mycotic toenails.  Right elbow with scaly, silvery plaque        ASSESSMENT/PLAN:    1. 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  Awaiting lab results from [facility]  Patient is up-to-date on eye exam and urine test  Foot exam today  Samples provided of [employer] .  Patient is to take 1 twice daily  -  DIABETES FOOT EXAM      2. Hyperlipidemia, unspecified hyperlipidemia type  Stability and control are uncertain  Await lab results  Recheck lab in 6 months  Further recommendations to follow review of his most recent result  - simvastatin (ZOCOR) 20 MG tablet; Take 1 tablet by mouth every morning  Dispense: 90 tablet; Refill: 3    3. Benign essential HTN  Blood pressure is well-controlled at 124/78  Check electrolytes and renal function in 6 months  Awaiting most recent labs from [facility]  - lisinopril (PRINIVIL;ZESTRIL) 30 MG tablet; TAKE 1 TABLET DAILY  Dispense: 90 tablet; Refill: 3    4. Benign prostatic hyperplasia with weak urinary stream  Doing well  Continue Flomax  - tamsulosin (FLOMAX) 0.4 MG capsule; TAKE 1 CAPSULE EVERY       EVENING  Dispense: 90 capsule; Refill: 3    5. Venous insufficiency of both lower

## 2024-05-06 NOTE — TELEPHONE ENCOUNTER
Patient called to advise Allison that the test results were found. They are at [facility]. Please call to get results for  To review

## 2024-05-06 NOTE — PATIENT INSTRUCTIONS
To do list:    #1 confirm your insurance will cover you going to the [facility] in [place] ([place])

## 2024-05-06 NOTE — ADDENDUM NOTE
Addended by: TIFFANY WIGGINS on: 5/6/2024 10:30 AM     Modules accepted: Orders, Level of Service

## 2024-07-29 DIAGNOSIS — L40.9 PSORIASIS: ICD-10-CM

## 2024-07-29 RX ORDER — BETAMETHASONE DIPROPIONATE 0.05 %
OINTMENT (GRAM) TOPICAL
Qty: 75 G | Refills: 1 | Status: SHIPPED | OUTPATIENT
Start: 2024-07-29

## 2024-07-29 NOTE — TELEPHONE ENCOUNTER
Last appointment: 5/6/2024  Next appointment: 11/6/2024  Last refill: 5/6/24    Requested Prescriptions     Pending Prescriptions Disp Refills    betamethasone dipropionate 0.05 % ointment [Pharmacy Med Name: BETAMETHASONE DP 0.05% OINT] 75 g 1     Sig: APPLY TOPICALLY TWICE A DAY FOR 2 WEEKS AT A TIME THEN TAKE A BREAK FOR 1 WEEK

## 2024-10-10 ENCOUNTER — TELEPHONE (OUTPATIENT)
Dept: INTERNAL MEDICINE CLINIC | Age: 74
End: 2024-10-10

## 2024-10-10 DIAGNOSIS — E78.5 HYPERLIPIDEMIA, UNSPECIFIED HYPERLIPIDEMIA TYPE: ICD-10-CM

## 2024-10-10 DIAGNOSIS — I10 BENIGN ESSENTIAL HTN: ICD-10-CM

## 2024-10-10 DIAGNOSIS — E11.40 CONTROLLED TYPE 2 DIABETES WITH NEUROPATHY (HCC): Primary | ICD-10-CM

## 2024-10-10 DIAGNOSIS — Z12.5 SCREENING FOR PROSTATE CANCER: ICD-10-CM

## 2024-10-10 NOTE — TELEPHONE ENCOUNTER
Patient is calling in for  in regards to his upcoming appt for awv on 11.6.24. Patient would like to have labs done before appt and would like lab order's to be mailed to him due to him going to an outside lab.     Please order labs and mail them to patient.

## 2024-10-10 NOTE — TELEPHONE ENCOUNTER
----- Message from Art D sent at 10/10/2024 12:49 PM EDT -----  Regarding: [facility] Referral Request  E[facility] Referral Request    Reason for referral request: Lab/Test Order    Specialist/Lab/Test patient is requesting (if known):blood work    Specialist Phone Number (if applicable):    Additional Information patient wants to get an order for his blood work  --------------------------------------------------------------------------------------------------------------------------    Relationship to Patient: Self     Call Back Information: OK to leave message on voicemail  Preferred Call Back Number: Phone [phone]

## 2024-10-14 ENCOUNTER — OFFICE VISIT (OUTPATIENT)
Age: 74
End: 2024-10-14
Payer: MEDICARE

## 2024-10-14 VITALS
SYSTOLIC BLOOD PRESSURE: 124 MMHG | HEIGHT: 73 IN | DIASTOLIC BLOOD PRESSURE: 70 MMHG | OXYGEN SATURATION: 97 % | BODY MASS INDEX: 34.48 KG/M2 | WEIGHT: 260.14 LBS | HEART RATE: 69 BPM

## 2024-10-14 DIAGNOSIS — E66.9 OBESITY (BMI 30-39.9): ICD-10-CM

## 2024-10-14 DIAGNOSIS — I10 BENIGN ESSENTIAL HTN: ICD-10-CM

## 2024-10-14 DIAGNOSIS — G47.33 OBSTRUCTIVE SLEEP APNEA TREATED WITH BIPAP: Primary | ICD-10-CM

## 2024-10-14 PROCEDURE — 1123F ACP DISCUSS/DSCN MKR DOCD: CPT | Performed by: STUDENT IN AN ORGANIZED HEALTH CARE EDUCATION/TRAINING PROGRAM

## 2024-10-14 PROCEDURE — 99214 OFFICE O/P EST MOD 30 MIN: CPT | Performed by: STUDENT IN AN ORGANIZED HEALTH CARE EDUCATION/TRAINING PROGRAM

## 2024-10-14 PROCEDURE — 3074F SYST BP LT 130 MM HG: CPT | Performed by: STUDENT IN AN ORGANIZED HEALTH CARE EDUCATION/TRAINING PROGRAM

## 2024-10-14 PROCEDURE — 3078F DIAST BP <80 MM HG: CPT | Performed by: STUDENT IN AN ORGANIZED HEALTH CARE EDUCATION/TRAINING PROGRAM

## 2024-10-14 PROCEDURE — G2211 COMPLEX E/M VISIT ADD ON: HCPCS | Performed by: STUDENT IN AN ORGANIZED HEALTH CARE EDUCATION/TRAINING PROGRAM

## 2024-10-14 ASSESSMENT — SLEEP AND FATIGUE QUESTIONNAIRES
HOW LIKELY ARE YOU TO NOD OFF OR FALL ASLEEP WHILE SITTING QUIETLY AFTER LUNCH WITHOUT ALCOHOL: WOULD NEVER DOZE
HOW LIKELY ARE YOU TO NOD OFF OR FALL ASLEEP IN A CAR, WHILE STOPPED FOR A FEW MINUTES IN TRAFFIC: WOULD NEVER DOZE
HOW LIKELY ARE YOU TO NOD OFF OR FALL ASLEEP WHILE LYING DOWN TO REST IN THE AFTERNOON WHEN CIRCUMSTANCES PERMIT: SLIGHT CHANCE OF DOZING
HOW LIKELY ARE YOU TO NOD OFF OR FALL ASLEEP WHEN YOU ARE A PASSENGER IN A CAR FOR AN HOUR WITHOUT A BREAK: WOULD NEVER DOZE
HOW LIKELY ARE YOU TO NOD OFF OR FALL ASLEEP WHILE SITTING AND TALKING TO SOMEONE: WOULD NEVER DOZE
HOW LIKELY ARE YOU TO NOD OFF OR FALL ASLEEP WHILE SITTING INACTIVE IN A PUBLIC PLACE: WOULD NEVER DOZE
HOW LIKELY ARE YOU TO NOD OFF OR FALL ASLEEP WHILE WATCHING TV: WOULD NEVER DOZE
HOW LIKELY ARE YOU TO NOD OFF OR FALL ASLEEP WHILE SITTING AND READING: WOULD NEVER DOZE
ESS TOTAL SCORE: 1

## 2024-10-14 NOTE — PROGRESS NOTES
Ordering Provider:   Demario Jackson MD - Diplomate, [facility] Sleep Medicine  [address] [place] [address], [place] [address]    Phone [phone]  Fax [phone]    Diagnosis: [x] MALIHA  (G47.33) [] CSA (G47.31) []  Other:__________________   Length of Need: [] 13 months [x]  99 Months                                          []  Other:__________________   Machine (PHYLLIS): [] Respironics Auto (with modem for remote monitoring)       [] Other:____________________    []  ResMed Auto (with modem for remote monitoring)    []  CPAP () [] Bilevel ()   Mode: Mode:   [] Auto [] Fixed [] Auto [] Spontaneous   Pmin:_________cmH2O      Pmax:_________cmH2O   P:_________cmH2O    EPAPmin:__________cmH2O IPAP:__________cmH2O     IPAPmax:__________cmH2O EPAP:__________cmH2O     PSmin:_______  PSmax:_______       (ResMed) PS:_________     Flex/EPR - 3 full time                          Ramp time: 30 min Flex/EPR - 3 full time                 Ramp time: 30 min   Ramp Pressure:___________cmH2O Ramp Pressure:____________cmH2O         Humidifier: [x] Heated ()                                               [] Passive     [x] Water chamber replacement ()/ 1 per 6 months   Mask:   [] Nasal () /1 per 3 months [x] Full Face () /1 per 3 months   [] Patient choice -Size and fit mask [x] Patient Choice - Size and fit mask   [] Dispense: nasal cushion  [] Dispense:  [x] Dispense: full face mask  [x] Dispense: AirTouch F20   [] Headgear () / 1 per 3 months [x] Headgear () / 1 per 3 months   [] Replacement Nasal Cushion ()/2 per month [x] Interface Replacement ()/1 per month   [] Replacement Nasal Pillows ()/2 per month       Tubing: [x] Heated ()/1 per 3 months                           [] Standard ()/1 per 3 months  [] Other:____________________   Filters: [x] Non-disposable ()/1 per 6 months                 [x] Ultra-Fine, Disposable

## 2024-10-14 NOTE — PROGRESS NOTES
[facility]  Sleep Medicine  [address], [address]  [place] [address]    Chief Complaint   Patient presents with    Sleep Apnea         Tim Mcdonnell comes in today for sleep apnea follow-up . He was diagnosed with severe obstructive sleep apnea and is being treated with PAP therapy.   He has been on PAP therapy for about a year.  He states he wears the PAP device every night.  At previous visit he was recommended to try AirFit F30 or AirTouch F20 for better comfort.  Today he reports his experience improved with the AirFit F30, however size might be a little small, which causes air leaks.    He falls asleep in  few minutes.  He awakens 0 times per night. The average total amount of sleep is about 6-7 hours per night and takes naps at times. He does not use sleep aid/s. Symptoms of sleep apnea have improved since using PAP therapy.  He is not snoring with the machine on.  He does complain of high/frequent leaks.   DME: [facility]  Mask: medium F20  Machine: ResMed Aircurve 10 VAuto        DATA REVIEWED TODAY:     Diagnostic Review  HST on 6/8/2023 showed respiratory event index of 58.5/h with oxygen desaturation down to a les of 66%. Patient subsequently underwent in lab PAP titration test.    [place]           10/14/2024     3:03 PM 3/11/2024    11:16 AM 10/2/2023     3:04 PM 5/8/2023    11:08 AM   Sleep Medicine   Sitting and reading 0 0 0 0   Watching TV 0 1 1 0   Sitting, inactive in a public place (e.g. a theatre or a meeting) 0 0 0 0   As a passenger in a car for an hour without a break 0 0 0 0   Lying down to rest in the afternoon when circumstances permit 1 0 1 3   Sitting and talking to someone 0 0 0 0   Sitting quietly after a lunch without alcohol 0 0 1 1   In a car, while stopped for a few minutes in traffic 0 0 0 0   [place] Sleepiness Score 1 1 3 4   Neck (Inches)   18.5 18       PAP Adherence (dates: 7/9/2024 - 10/6/2024)  Total days used: 84/90  % used days

## 2024-10-30 LAB
ALBUMIN: 4.4 G/DL
ALP BLD-CCNC: 54 U/L
ALT SERPL-CCNC: 30 U/L
ANION GAP SERPL CALCULATED.3IONS-SCNC: NORMAL MMOL/L
AST SERPL-CCNC: 19 U/L
BASOPHILS ABSOLUTE: 41 /ΜL
BASOPHILS RELATIVE PERCENT: 0.77 %
BILIRUB SERPL-MCNC: 0.6 MG/DL (ref 0.1–1.4)
BUN BLDV-MCNC: 13 MG/DL
CALCIUM SERPL-MCNC: 9.7 MG/DL
CHLORIDE BLD-SCNC: 103 MMOL/L
CHOLESTEROL, TOTAL: 137 MG/DL
CHOLESTEROL/HDL RATIO: 2.1
CO2: 27 MMOL/L
CREAT SERPL-MCNC: 0.84 MG/DL
EOSINOPHILS ABSOLUTE: 159 /ΜL
EOSINOPHILS RELATIVE PERCENT: 2.7 %
ESTIMATED AVERAGE GLUCOSE: NORMAL
GFR, ESTIMATED: 93
GLUCOSE BLD-MCNC: 130 MG/DL
HBA1C MFR BLD: 6.8 %
HCT VFR BLD CALC: 40.1 % (ref 41–53)
HDLC SERPL-MCNC: 64 MG/DL (ref 35–70)
HEMOGLOBIN: 13 G/DL (ref 13.5–17.5)
LDL CHOLESTEROL: 55
LYMPHOCYTES ABSOLUTE: 1021 /ΜL
LYMPHOCYTES RELATIVE PERCENT: 17.3 %
MCH RBC QN AUTO: 30.2 PG
MCHC RBC AUTO-ENTMCNC: 32.4 G/DL
MCV RBC AUTO: 93 FL
MONOCYTES ABSOLUTE: 608 /ΜL
MONOCYTES RELATIVE PERCENT: 10.3 %
NEUTROPHILS ABSOLUTE: 4071 /ΜL
NEUTROPHILS RELATIVE PERCENT: 69 %
NONHDLC SERPL-MCNC: 73 MG/DL
PLATELET # BLD: 222 K/ΜL
PMV BLD AUTO: 11.1 FL
POTASSIUM SERPL-SCNC: 4.4 MMOL/L
PROSTATE SPECIFIC ANTIGEN: 3 NG/ML
RBC # BLD: 4.31 10^6/ΜL
SODIUM BLD-SCNC: 140 MMOL/L
TOTAL PROTEIN: 7.4 G/DL (ref 6.4–8.2)
TRIGL SERPL-MCNC: 98 MG/DL
VLDLC SERPL CALC-MCNC: NORMAL MG/DL
WBC # BLD: 5.9 10^3/ML

## 2024-11-06 ENCOUNTER — OFFICE VISIT (OUTPATIENT)
Dept: INTERNAL MEDICINE CLINIC | Age: 74
End: 2024-11-06

## 2024-11-06 VITALS
HEIGHT: 71 IN | SYSTOLIC BLOOD PRESSURE: 132 MMHG | HEART RATE: 91 BPM | OXYGEN SATURATION: 98 % | DIASTOLIC BLOOD PRESSURE: 78 MMHG | WEIGHT: 259 LBS | BODY MASS INDEX: 36.26 KG/M2 | TEMPERATURE: 97.5 F

## 2024-11-06 DIAGNOSIS — E78.5 HYPERLIPIDEMIA, UNSPECIFIED HYPERLIPIDEMIA TYPE: ICD-10-CM

## 2024-11-06 DIAGNOSIS — E11.40 CONTROLLED TYPE 2 DIABETES WITH NEUROPATHY (HCC): ICD-10-CM

## 2024-11-06 DIAGNOSIS — I10 BENIGN ESSENTIAL HTN: ICD-10-CM

## 2024-11-06 DIAGNOSIS — R97.20 INCREASED PROSTATE SPECIFIC ANTIGEN (PSA) VELOCITY: ICD-10-CM

## 2024-11-06 DIAGNOSIS — Z23 FLU VACCINE NEED: ICD-10-CM

## 2024-11-06 DIAGNOSIS — Z00.00 MEDICARE ANNUAL WELLNESS VISIT, SUBSEQUENT: Primary | ICD-10-CM

## 2024-11-06 PROBLEM — E11.9 TYPE 2 DIABETES MELLITUS WITHOUT COMPLICATION, WITHOUT LONG-TERM CURRENT USE OF INSULIN (HCC): Status: RESOLVED | Noted: 2017-08-18 | Resolved: 2024-11-06

## 2024-11-06 ASSESSMENT — PATIENT HEALTH QUESTIONNAIRE - PHQ9
SUM OF ALL RESPONSES TO PHQ QUESTIONS 1-9: 0
2. FEELING DOWN, DEPRESSED OR HOPELESS: NOT AT ALL
SUM OF ALL RESPONSES TO PHQ9 QUESTIONS 1 & 2: 0
SUM OF ALL RESPONSES TO PHQ QUESTIONS 1-9: 0
SUM OF ALL RESPONSES TO PHQ QUESTIONS 1-9: 0
1. LITTLE INTEREST OR PLEASURE IN DOING THINGS: NOT AT ALL
SUM OF ALL RESPONSES TO PHQ QUESTIONS 1-9: 0

## 2024-11-06 ASSESSMENT — LIFESTYLE VARIABLES: HOW OFTEN DO YOU HAVE A DRINK CONTAINING ALCOHOL: NEVER

## 2024-11-06 NOTE — PATIENT INSTRUCTIONS
Personalized Preventive Plan for Tim Mcdonnell - 11/6/2024  Medicare offers a range of preventive health benefits. Some of the tests and screenings are paid in full while other may be subject to a deductible, co-insurance, and/or copay.    Some of these benefits include a comprehensive review of your medical history including lifestyle, illnesses that may run in your family, and various assessments and screenings as appropriate.    After reviewing your medical record and screening and assessments performed today your provider may have ordered immunizations, labs, imaging, and/or referrals for you.  A list of these orders (if applicable) as well as your Preventive Care list are included within your After Visit Summary for your review.    Other Preventive Recommendations:    Please have a yearly eye exam for diabetes.  Send report  A preventive dental visit is recommended every 6 months.  Try to get at least 150 minutes of exercise per week or 10,000 steps per day on a pedometer .  Order or download the FREE \"Exercise & Physical Activity: Your Everyday Guide\" from [employer]. Call 1-262.229.7216 or search [employer] online.  You need 4021-7023 mg of calcium and 9192-0448 IU of vitamin D per day. It is possible to meet your calcium requirement with diet alone, but a vitamin D supplement is usually necessary to meet this goal.  When exposed to the sun, use a sunscreen that protects against both UVA and UVB radiation with an SPF of 30 or greater. Reapply every 2 to 3 hours or after sweating, drying off with a towel, or swimming.  Always wear a seat belt when traveling in a car.      Hearing Loss: Care Instructions  Overview     Hearing loss is a sudden or slow decrease in how well you hear. It can range from slight to profound. Permanent hearing loss can occur with aging. It also can happen when you are exposed long-term to loud noise. Examples include listening to loud music,

## 2024-11-06 NOTE — PROGRESS NOTES
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 pylori infection 10/2017    Dr. Mccloud    Herpes     Hyperlipidemia     Hypertension     Obesity (BMI 30-39.9) 08/18/2017    Primary osteoarthritis of right hip 07/17/2019    Psoriasis     Type II or unspecified type diabetes mellitus without mention of complication, not stated as uncontrolled     Venous insufficiency of both lower extremities           Past Surgical History:   Procedure Laterality Date    COLONOSCOPY  2006    COLONOSCOPY  10/16/2017    Dr. Mccloud    SINUS SURGERY  1995    TOTAL HIP ARTHROPLASTY  08/06/2019    Dr. Raulito Quinonez    UPPER GASTROINTESTINAL ENDOSCOPY  10/16/2017    Dr. Mccloud           Medications/Allergies     Current Outpatient Medications   Medication Sig    betamethasone dipropionate 0.05 % ointment APPLY TOPICALLY TWICE A DAY FOR 2 WEEKS AT A TIME THEN TAKE A BREAK FOR 1 WEEK    simvastatin (ZOCOR) 20 MG tablet Take 1 tablet by mouth every morning    sitaGLIPtan-metFORMIN (JANUMET)  MG per tablet TAKE 1 TABLET TWICE DAILY  WITH MEALS    lisinopril (PRINIVIL;ZESTRIL) 30 MG tablet TAKE 1 TABLET DAILY    tamsulosin (FLOMAX) 0.4 MG capsule TAKE 1 CAPSULE EVERY       EVENING    aspirin 81 MG EC tablet Take 1 tablet by mouth daily    terbinafine (LAMISIL) 1 % cream Apply topically 2 times daily.    blood glucose test strips (ONETOUCH VERIO) strip 1 each by In Vitro route 2 times daily DX : E11.9    OneTouch Delica Lancets 30G MISC 1 each by Does not apply route in the morning and at bedtime DX : E11.9    fluticasone (FLONASE) 50 MCG/ACT

## 2024-11-07 LAB
CREAT UR-MCNC: 71.6 MG/DL (ref 39–259)
MICROALBUMIN UR DL<=1MG/L-MCNC: <1.2 MG/DL
MICROALBUMIN/CREAT UR: NORMAL MG/G (ref 0–30)

## 2024-12-13 ENCOUNTER — TELEPHONE (OUTPATIENT)
Dept: PHARMACY | Facility: CLINIC | Age: 74
End: 2024-12-13

## 2024-12-13 NOTE — TELEPHONE ENCOUNTER
[facility] CLINICAL PHARMACY: ADHERENCE REVIEW  Identified care gap per [place]: fills at [facility]: Diabetes adherence    Patient also appears to be prescribed: ACE/ARB and Statin (Passed  adherence measure)     ASSESSMENT  DIABETES ADHERENCE    Insurance Records claims through 24 (Prior Year PDC = not reported; YTD PDC = 84%; Potential Fail Date: 24):   JANUMET 50-500MG Next refill due: 10/20/24    Prescribed si tablet/capsule twice daily    Per Insurer Portal: last filled on 24 for 90 day supply. [facility]RF    Mail order not contacted    Lab Results   Component Value Date    LABA1C 6.8 10/29/2024    LABA1C 6.9 2024    LABA1C 6.4 (H) 10/24/2023     NOTE: A1c <9%      The following are interventions that have been identified:   Patient OVERDUE refilling JANUMET      TAB 50-500MG and active on home medication list.     Attempting to reach patient to review.  Left message asking for return call. [employer] message sent to patient.    Last Visit: 24  Next Visit: 25    Aleida Martins CPhT.   [facility] Clinical   [facility] Clinical [facility]  Toll free: [phone] Option 1

## 2024-12-13 NOTE — TELEPHONE ENCOUNTER
Patient returned call.  Verified he's taking BID and has enough medication until the end of the year.  Kindly declined a refill for this or any medications.    Thanked him for return call    Aleida Martins CPhT.   Population Health Clinical   [facility] Clinical Pharm[facility]  Toll free: [phone] Option 1     For Pharmacy Admin Tracking Only    Program: [employer]  CPA in place:  No  Recommendation Provided To: Patient/Caregiver: 1 via Telephone  Intervention Detail: Adherence Monitorin  Intervention Accepted By: Patient/Caregiver: 1  Gap Closed?: Yes   Time Spent (min): 15

## 2025-01-21 ENCOUNTER — OFFICE VISIT (OUTPATIENT)
Dept: INTERNAL MEDICINE CLINIC | Age: 75
End: 2025-01-21

## 2025-01-21 VITALS
HEART RATE: 66 BPM | DIASTOLIC BLOOD PRESSURE: 70 MMHG | WEIGHT: 263 LBS | BODY MASS INDEX: 36.68 KG/M2 | OXYGEN SATURATION: 98 % | SYSTOLIC BLOOD PRESSURE: 150 MMHG

## 2025-01-21 DIAGNOSIS — R07.9 CHEST PAIN, UNSPECIFIED TYPE: ICD-10-CM

## 2025-01-21 DIAGNOSIS — R07.89 ATYPICAL CHEST PAIN: Primary | ICD-10-CM

## 2025-01-21 DIAGNOSIS — K80.20 CALCULUS OF GALLBLADDER WITHOUT CHOLECYSTITIS WITHOUT OBSTRUCTION: ICD-10-CM

## 2025-01-21 DIAGNOSIS — Z79.2 PROPHYLACTIC ANTIBIOTIC: ICD-10-CM

## 2025-01-21 DIAGNOSIS — R39.12 BENIGN PROSTATIC HYPERPLASIA WITH WEAK URINARY STREAM: ICD-10-CM

## 2025-01-21 DIAGNOSIS — B35.3 TINEA PEDIS OF BOTH FEET: ICD-10-CM

## 2025-01-21 DIAGNOSIS — E78.5 HYPERLIPIDEMIA, UNSPECIFIED HYPERLIPIDEMIA TYPE: ICD-10-CM

## 2025-01-21 DIAGNOSIS — Z12.5 SCREENING FOR PROSTATE CANCER: ICD-10-CM

## 2025-01-21 DIAGNOSIS — I10 BENIGN ESSENTIAL HTN: ICD-10-CM

## 2025-01-21 DIAGNOSIS — E11.40 CONTROLLED TYPE 2 DIABETES WITH NEUROPATHY (HCC): ICD-10-CM

## 2025-01-21 DIAGNOSIS — N40.1 BENIGN PROSTATIC HYPERPLASIA WITH WEAK URINARY STREAM: ICD-10-CM

## 2025-01-21 DIAGNOSIS — I87.2 VENOUS INSUFFICIENCY OF BOTH LOWER EXTREMITIES: ICD-10-CM

## 2025-01-21 RX ORDER — AMOXICILLIN 500 MG/1
CAPSULE ORAL
Qty: 30 CAPSULE | Refills: 0 | Status: SHIPPED | OUTPATIENT
Start: 2025-01-21

## 2025-01-21 RX ORDER — TAMSULOSIN HYDROCHLORIDE 0.4 MG/1
CAPSULE ORAL
Qty: 90 CAPSULE | Refills: 3 | Status: SHIPPED | OUTPATIENT
Start: 2025-01-21 | End: 2025-01-21

## 2025-01-21 RX ORDER — KETOCONAZOLE 20 MG/G
CREAM TOPICAL
Qty: 30 G | Refills: 1 | Status: SHIPPED | OUTPATIENT
Start: 2025-01-21

## 2025-01-21 RX ORDER — BLOOD SUGAR DIAGNOSTIC
1 STRIP MISCELLANEOUS 2 TIMES DAILY
Qty: 200 EACH | Refills: 3 | Status: SHIPPED | OUTPATIENT
Start: 2025-01-21

## 2025-01-21 RX ORDER — LISINOPRIL 30 MG/1
TABLET ORAL
Qty: 90 TABLET | Refills: 3 | Status: SHIPPED | OUTPATIENT
Start: 2025-01-21 | End: 2025-01-21 | Stop reason: DRUGHIGH

## 2025-01-21 RX ORDER — BLOOD SUGAR DIAGNOSTIC
1 STRIP MISCELLANEOUS 2 TIMES DAILY
Qty: 200 EACH | Refills: 3 | Status: SHIPPED | OUTPATIENT
Start: 2025-01-21 | End: 2025-01-21

## 2025-01-21 RX ORDER — SIMVASTATIN 20 MG
20 TABLET ORAL EVERY MORNING
Qty: 90 TABLET | Refills: 3 | Status: SHIPPED | OUTPATIENT
Start: 2025-01-21 | End: 2025-01-21

## 2025-01-21 RX ORDER — AMOXICILLIN 500 MG/1
CAPSULE ORAL
Qty: 4 CAPSULE | Refills: 0 | Status: SHIPPED | OUTPATIENT
Start: 2025-01-21 | End: 2025-01-21 | Stop reason: SDUPTHER

## 2025-01-21 RX ORDER — LISINOPRIL 40 MG/1
40 TABLET ORAL DAILY
Qty: 90 TABLET | Refills: 1 | Status: SHIPPED | OUTPATIENT
Start: 2025-01-21

## 2025-01-21 RX ORDER — TAMSULOSIN HYDROCHLORIDE 0.4 MG/1
CAPSULE ORAL
Qty: 90 CAPSULE | Refills: 3 | Status: SHIPPED | OUTPATIENT
Start: 2025-01-21

## 2025-01-21 RX ORDER — SITAGLIPTIN AND METFORMIN HYDROCHLORIDE 500; 50 MG/1; MG/1
1 TABLET, FILM COATED ORAL 2 TIMES DAILY WITH MEALS
COMMUNITY

## 2025-01-21 RX ORDER — LANCETS 30 GAUGE
EACH MISCELLANEOUS
Qty: 200 EACH | Refills: 3 | Status: SHIPPED | OUTPATIENT
Start: 2025-01-21

## 2025-01-21 RX ORDER — SIMVASTATIN 20 MG
20 TABLET ORAL EVERY MORNING
Qty: 90 TABLET | Refills: 3 | Status: SHIPPED | OUTPATIENT
Start: 2025-01-21

## 2025-01-21 RX ORDER — LISINOPRIL 30 MG/1
TABLET ORAL
Qty: 90 TABLET | Refills: 3 | Status: SHIPPED | OUTPATIENT
Start: 2025-01-21 | End: 2025-01-21

## 2025-01-21 SDOH — ECONOMIC STABILITY: FOOD INSECURITY: WITHIN THE PAST 12 MONTHS, THE FOOD YOU BOUGHT JUST DIDN'T LAST AND YOU DIDN'T HAVE MONEY TO GET MORE.: NEVER TRUE

## 2025-01-21 SDOH — ECONOMIC STABILITY: FOOD INSECURITY: WITHIN THE PAST 12 MONTHS, YOU WORRIED THAT YOUR FOOD WOULD RUN OUT BEFORE YOU GOT MONEY TO BUY MORE.: NEVER TRUE

## 2025-01-21 ASSESSMENT — PATIENT HEALTH QUESTIONNAIRE - PHQ9
SUM OF ALL RESPONSES TO PHQ QUESTIONS 1-9: 0
SUM OF ALL RESPONSES TO PHQ9 QUESTIONS 1 & 2: 0
SUM OF ALL RESPONSES TO PHQ QUESTIONS 1-9: 0
2. FEELING DOWN, DEPRESSED OR HOPELESS: NOT AT ALL
1. LITTLE INTEREST OR PLEASURE IN DOING THINGS: NOT AT ALL
SUM OF ALL RESPONSES TO PHQ QUESTIONS 1-9: 0
SUM OF ALL RESPONSES TO PHQ QUESTIONS 1-9: 0

## 2025-01-21 NOTE — TELEPHONE ENCOUNTER
Last appointment: 11/6/2024  Next appointment: 1/21/2025 acutely.   Next ov is 5/7/25        Last refill: 8/23/23

## 2025-01-21 NOTE — PROGRESS NOTES
[facility]  Internal Medicine  Patient Encounter  Navid Rodrigez D.O., [facility]         Chief Complaint   Patient presents with    Chest Pain     Left sided cheat pain, he does not think it is a heart pain       HPI: 74 y.o. male seen today for follow up regarding the status of his current chronic medical problems listed below and med reconciliation.  He is doing well.  He offers no new concerns.      Patient is also asking for amoxicillin for antibiotic prophylaxis prior to dental procedure.    Patient did not obtain lab tests prior to the appointment that were ordered.    Diabetes Mellitus Type II, Follow-up--   Lab Results   Component Value Date    LABA1C 6.8 10/29/2024     Patient has lost a little weight, but he is walking daily.  He does not adhere to a low carb diet.  He tries to  eat less.  He denies burning in feet.    Last Eye Exam--10/13/2023  U.Microalbumin/Cr--10/31/2023, normal  Pt is on ACEI-- Lisinopril  Complications-- None  Insulin Treated? No.    + ASA 81 mg daily  + Statin  Last LDL--58    HTN--Patient denies any new headaches, lightheadedness, syncope, chest pain, shortness of breath, orthopnea.    Pt is C/O left sided chest discomfort.  Pt states symptoms started about 2-3 months ago, but he is not sure.  He denies exertional pain.  Symptoms occur at rest and can last for a minutes.  He describes pressure.  He can navigate stairs without difficulty.  He was able to shovel snow without difficulty.  He denies associated nausea.  He denies sweats, SOB.  He denies orthopnea.      BPH--  He denies any symptoms of slow stream, nocturia, frequency.  He denies any hematuria.  He is doing well with Flomax.      Gallstones-- Previous ultrasound in September 2017 showed cholelithiasis.  He denies postprandial pain or nausea.      Hyperlipidemia:  Lab Results   Component Value Date    LDL 55 10/29/2024        He denies myalgias or weakness from medication.  Pt is  following Lifestyle

## 2025-01-30 ENCOUNTER — HOSPITAL ENCOUNTER (OUTPATIENT)
Age: 75
Discharge: HOME | End: 2025-01-30
Payer: MEDICARE

## 2025-01-30 ENCOUNTER — HOSPITAL ENCOUNTER (OUTPATIENT)
Age: 75
Discharge: HOME | End: 2025-02-01
Payer: MEDICARE

## 2025-01-30 VITALS — BODY MASS INDEX: 35.62 KG/M2 | HEIGHT: 72 IN | WEIGHT: 263 LBS

## 2025-01-30 DIAGNOSIS — R07.9 CHEST PAIN, UNSPECIFIED TYPE: ICD-10-CM

## 2025-01-30 DIAGNOSIS — E78.5 HYPERLIPIDEMIA, UNSPECIFIED HYPERLIPIDEMIA TYPE: ICD-10-CM

## 2025-01-30 DIAGNOSIS — R07.89 ATYPICAL CHEST PAIN: ICD-10-CM

## 2025-01-30 DIAGNOSIS — I10 BENIGN ESSENTIAL HTN: ICD-10-CM

## 2025-01-30 DIAGNOSIS — E11.40 CONTROLLED TYPE 2 DIABETES WITH NEUROPATHY (HCC): ICD-10-CM

## 2025-01-30 LAB
ECHO BSA: 2.46 M2
NUC STRESS EJECTION FRACTION: 77 %
NUC STRESS LV EDV: 112 ML (ref 67–155)
NUC STRESS LV ESV: 26 ML (ref 22–58)
NUC STRESS LV MASS: 141 G
NUC STRESS LV STROKE VOLUME: 86 ML
STRESS ANGINA INDEX: 0
STRESS BASELINE DIAS BP: 69 MMHG
STRESS BASELINE HR: 68 BPM
STRESS BASELINE SYS BP: 130 MMHG
STRESS ESTIMATED WORKLOAD: 4 METS
STRESS EXERCISE DUR MIN: 5 MIN
STRESS EXERCISE DUR SEC: 1 SEC
STRESS PEAK DIAS BP: 55 MMHG
STRESS PEAK SYS BP: 183 MMHG
STRESS PERCENT HR ACHIEVED: 122 %
STRESS POST PEAK HR: 181 BPM
STRESS RATE PRESSURE PRODUCT: NORMAL BPM*MMHG
STRESS TARGET HR: 148 BPM

## 2025-01-30 PROCEDURE — 3430000000 HC RX DIAGNOSTIC RADIOPHARMACEUTICAL: Performed by: INTERNAL MEDICINE

## 2025-01-30 PROCEDURE — 93018 CV STRESS TEST I&R ONLY: CPT | Performed by: INTERNAL MEDICINE

## 2025-01-30 PROCEDURE — 93016 CV STRESS TEST SUPVJ ONLY: CPT | Performed by: INTERNAL MEDICINE

## 2025-01-30 PROCEDURE — A9502 TC99M TETROFOSMIN: HCPCS | Performed by: INTERNAL MEDICINE

## 2025-01-30 PROCEDURE — 78452 HT MUSCLE IMAGE SPECT MULT: CPT

## 2025-01-30 PROCEDURE — 93017 CV STRESS TEST TRACING ONLY: CPT

## 2025-01-30 PROCEDURE — 78452 HT MUSCLE IMAGE SPECT MULT: CPT | Performed by: INTERNAL MEDICINE

## 2025-01-30 RX ADMIN — TETROFOSMIN 33.7 MILLICURIE: 1.38 INJECTION, POWDER, LYOPHILIZED, FOR SOLUTION INTRAVENOUS at 10:55

## 2025-01-30 RX ADMIN — TETROFOSMIN 10.5 MILLICURIE: 1.38 INJECTION, POWDER, LYOPHILIZED, FOR SOLUTION INTRAVENOUS at 09:50

## 2025-01-30 NOTE — NURSING NOTE
Patient educated on cardiac stress testing, pt verbalizes understanding. Questions and concerns addressed, pt is agreeable to proceed with stress testing.

## 2025-01-30 NOTE — NURSING NOTE
Patient completed stress portion of cardiac stress test. Patient had no complaints of Chest pain. Patient did have SOB that was resolved with rest. Patient appeared to be tense during the test and was instructed to relax arms and shoulders. Pt is discharged to nuclear department for final scan, nuclear tech will remove PIV. Discharge instructions given, pt verbalized understanding to discharge instructions.

## 2025-04-02 DIAGNOSIS — J06.9 VIRAL URI WITH COUGH: ICD-10-CM

## 2025-04-02 RX ORDER — FLUTICASONE PROPIONATE 50 MCG
2 SPRAY, SUSPENSION (ML) NASAL DAILY
Qty: 16 G | Refills: 3 | Status: SHIPPED | OUTPATIENT
Start: 2025-04-02

## 2025-04-29 DIAGNOSIS — Z12.5 SCREENING FOR PROSTATE CANCER: ICD-10-CM

## 2025-04-29 DIAGNOSIS — I10 BENIGN ESSENTIAL HTN: ICD-10-CM

## 2025-04-29 DIAGNOSIS — E11.40 CONTROLLED TYPE 2 DIABETES WITH NEUROPATHY (HCC): ICD-10-CM

## 2025-04-29 DIAGNOSIS — E78.5 HYPERLIPIDEMIA, UNSPECIFIED HYPERLIPIDEMIA TYPE: ICD-10-CM

## 2025-04-29 LAB
ALBUMIN SERPL-MCNC: 4.3 G/DL (ref 3.4–5)
ALBUMIN/GLOB SERPL: 1.4 {RATIO} (ref 1.1–2.2)
ALP SERPL-CCNC: 63 U/L (ref 40–129)
ALT SERPL-CCNC: 32 U/L (ref 10–40)
ANION GAP SERPL CALCULATED.3IONS-SCNC: 12 MMOL/L (ref 3–16)
AST SERPL-CCNC: 25 U/L (ref 15–37)
BASOPHILS # BLD: 0.1 K/UL (ref 0–0.2)
BASOPHILS NFR BLD: 1.1 %
BILIRUB SERPL-MCNC: 0.6 MG/DL (ref 0–1)
BUN SERPL-MCNC: 14 MG/DL (ref 7–20)
CALCIUM SERPL-MCNC: 9.7 MG/DL (ref 8.3–10.6)
CHLORIDE SERPL-SCNC: 103 MMOL/L (ref 99–110)
CHOLEST SERPL-MCNC: 154 MG/DL (ref 0–199)
CO2 SERPL-SCNC: 26 MMOL/L (ref 21–32)
CREAT SERPL-MCNC: 0.8 MG/DL (ref 0.8–1.3)
DEPRECATED RDW RBC AUTO: 14.4 % (ref 12.4–15.4)
EOSINOPHIL # BLD: 0.2 K/UL (ref 0–0.6)
EOSINOPHIL NFR BLD: 2.8 %
GFR SERPLBLD CREATININE-BSD FMLA CKD-EPI: >90 ML/MIN/{1.73_M2}
GLUCOSE SERPL-MCNC: 118 MG/DL (ref 70–99)
HCT VFR BLD AUTO: 39.1 % (ref 40.5–52.5)
HDLC SERPL-MCNC: 71 MG/DL (ref 40–60)
HGB BLD-MCNC: 13.6 G/DL (ref 13.5–17.5)
LDLC SERPL CALC-MCNC: 65 MG/DL
LYMPHOCYTES # BLD: 1.5 K/UL (ref 1–5.1)
LYMPHOCYTES NFR BLD: 23.3 %
MCH RBC QN AUTO: 30.8 PG (ref 26–34)
MCHC RBC AUTO-ENTMCNC: 34.8 G/DL (ref 31–36)
MCV RBC AUTO: 88.2 FL (ref 80–100)
MONOCYTES # BLD: 0.5 K/UL (ref 0–1.3)
MONOCYTES NFR BLD: 7.5 %
NEUTROPHILS # BLD: 4.1 K/UL (ref 1.7–7.7)
NEUTROPHILS NFR BLD: 65.3 %
PLATELET # BLD AUTO: 189 K/UL (ref 135–450)
PMV BLD AUTO: 9.9 FL (ref 5–10.5)
POTASSIUM SERPL-SCNC: 4.3 MMOL/L (ref 3.5–5.1)
PROT SERPL-MCNC: 7.3 G/DL (ref 6.4–8.2)
PSA SERPL DL<=0.01 NG/ML-MCNC: 2.85 NG/ML (ref 0–4)
RBC # BLD AUTO: 4.43 M/UL (ref 4.2–5.9)
SODIUM SERPL-SCNC: 141 MMOL/L (ref 136–145)
TRIGL SERPL-MCNC: 91 MG/DL (ref 0–150)
TSH SERPL DL<=0.005 MIU/L-ACNC: 2.09 UIU/ML (ref 0.27–4.2)
VLDLC SERPL CALC-MCNC: 18 MG/DL
WBC # BLD AUTO: 6.3 K/UL (ref 4–11)

## 2025-04-30 ENCOUNTER — RESULTS FOLLOW-UP (OUTPATIENT)
Dept: INTERNAL MEDICINE CLINIC | Age: 75
End: 2025-04-30

## 2025-04-30 LAB
EST. AVERAGE GLUCOSE BLD GHB EST-MCNC: 139.9 MG/DL
HBA1C MFR BLD: 6.5 %

## 2025-05-07 ENCOUNTER — OFFICE VISIT (OUTPATIENT)
Dept: INTERNAL MEDICINE CLINIC | Age: 75
End: 2025-05-07
Payer: MEDICARE

## 2025-05-07 VITALS
BODY MASS INDEX: 33.77 KG/M2 | DIASTOLIC BLOOD PRESSURE: 70 MMHG | WEIGHT: 249 LBS | TEMPERATURE: 97.3 F | OXYGEN SATURATION: 98 % | HEART RATE: 97 BPM | SYSTOLIC BLOOD PRESSURE: 128 MMHG

## 2025-05-07 DIAGNOSIS — N40.1 BENIGN PROSTATIC HYPERPLASIA WITH WEAK URINARY STREAM: ICD-10-CM

## 2025-05-07 DIAGNOSIS — E11.40 CONTROLLED TYPE 2 DIABETES WITH NEUROPATHY (HCC): Primary | ICD-10-CM

## 2025-05-07 DIAGNOSIS — I87.2 VENOUS INSUFFICIENCY OF BOTH LOWER EXTREMITIES: ICD-10-CM

## 2025-05-07 DIAGNOSIS — E78.5 HYPERLIPIDEMIA, UNSPECIFIED HYPERLIPIDEMIA TYPE: ICD-10-CM

## 2025-05-07 DIAGNOSIS — B35.3 TINEA PEDIS OF BOTH FEET: ICD-10-CM

## 2025-05-07 DIAGNOSIS — K80.20 CALCULUS OF GALLBLADDER WITHOUT CHOLECYSTITIS WITHOUT OBSTRUCTION: ICD-10-CM

## 2025-05-07 DIAGNOSIS — R39.12 BENIGN PROSTATIC HYPERPLASIA WITH WEAK URINARY STREAM: ICD-10-CM

## 2025-05-07 DIAGNOSIS — I10 BENIGN ESSENTIAL HTN: ICD-10-CM

## 2025-05-07 PROCEDURE — 1123F ACP DISCUSS/DSCN MKR DOCD: CPT | Performed by: INTERNAL MEDICINE

## 2025-05-07 PROCEDURE — 3078F DIAST BP <80 MM HG: CPT | Performed by: INTERNAL MEDICINE

## 2025-05-07 PROCEDURE — 3074F SYST BP LT 130 MM HG: CPT | Performed by: INTERNAL MEDICINE

## 2025-05-07 PROCEDURE — G2211 COMPLEX E/M VISIT ADD ON: HCPCS | Performed by: INTERNAL MEDICINE

## 2025-05-07 PROCEDURE — 99214 OFFICE O/P EST MOD 30 MIN: CPT | Performed by: INTERNAL MEDICINE

## 2025-05-07 PROCEDURE — 3044F HG A1C LEVEL LT 7.0%: CPT | Performed by: INTERNAL MEDICINE

## 2025-05-07 PROCEDURE — 1159F MED LIST DOCD IN RCRD: CPT | Performed by: INTERNAL MEDICINE

## 2025-05-07 RX ORDER — KETOCONAZOLE 20 MG/G
CREAM TOPICAL
Qty: 60 G | Refills: 1 | Status: SHIPPED | OUTPATIENT
Start: 2025-05-07

## 2025-05-07 NOTE — PATIENT INSTRUCTIONS
To Do List:    #1 Continue Lifestyle modification including low calorie diet focusing on Low fat/low cholesterol and low carbohydrate intake, along with  increasing cardiovascular (aerobic) exercise.     #2 Bring in BP machine to next appointment.  Call if BP is >130/80 consistently.

## 2025-05-07 NOTE — PROGRESS NOTES
lifestyle modification and weight loss efforts  Continue a no added salt diet.  Patient educated  - CBC with Auto Differential; Future  - Comprehensive Metabolic Panel; Future  - Lipid Panel; Future    4. Hyperlipidemia, unspecified hyperlipidemia type  Stability and control are uncertain  Due for lab  Continue simvastatin 20 mg daily as part of his overall cardiovascular disease primary prevention strategy.  - Comprehensive Metabolic Panel; Future  - Lipid Panel; Future    5. Benign prostatic hyperplasia with weak urinary stream  Overall voiding well.  He thinks the tamsulosin is working well.  Stream is slower but overall improved since starting the medication.    6. Venous insufficiency of both lower extremities  Condition is stable with minimal edema on exam  Recommended compression stockings which the patient refuses to wear    7. Calculus of gallbladder without cholecystitis without obstruction  Asymptomatic  Encouraged patient to follow a low-fat diet        Discussed medications with patient who voiced understanding of their use and indication. All questions answered.       This note was generated completely or in part utilizing Dragon dictation speech recognition software.  Occasionally, words are mistranscribed and despite editing, the text may contain inaccuracies due to incorrect word recognition.  If further clarification is needed please contact the office at [phone]

## 2025-06-04 DIAGNOSIS — I10 BENIGN ESSENTIAL HTN: ICD-10-CM

## 2025-06-04 RX ORDER — LISINOPRIL 40 MG/1
40 TABLET ORAL DAILY
Qty: 90 TABLET | Refills: 3 | Status: SHIPPED | OUTPATIENT
Start: 2025-06-04

## 2025-06-04 NOTE — TELEPHONE ENCOUNTER
Last appointment: 5/7/2025  Next appointment: 11/12/2025        Last refill: (1/21/2025) by Navid Rodrigez DO

## 2025-06-17 DIAGNOSIS — B35.3 TINEA PEDIS OF BOTH FEET: ICD-10-CM

## 2025-06-17 NOTE — TELEPHONE ENCOUNTER
Last appointment: 5/7/2025  Next appointment: 11/12/2025        Last refill: (5/7/2025) by Navid Rodrigez DO

## 2025-06-18 RX ORDER — KETOCONAZOLE 20 MG/G
CREAM TOPICAL
Qty: 60 G | Refills: 3 | Status: SHIPPED | OUTPATIENT
Start: 2025-06-18

## 2025-08-11 RX ORDER — SITAGLIPTIN AND METFORMIN HYDROCHLORIDE 500; 50 MG/1; MG/1
1 TABLET, FILM COATED ORAL 2 TIMES DAILY
Qty: 180 TABLET | Refills: 1 | Status: SHIPPED | OUTPATIENT
Start: 2025-08-11